# Patient Record
Sex: FEMALE | Race: WHITE | NOT HISPANIC OR LATINO | Employment: UNEMPLOYED | URBAN - METROPOLITAN AREA
[De-identification: names, ages, dates, MRNs, and addresses within clinical notes are randomized per-mention and may not be internally consistent; named-entity substitution may affect disease eponyms.]

---

## 2018-03-08 ENCOUNTER — HISTORICAL (OUTPATIENT)
Dept: ADMINISTRATIVE | Facility: HOSPITAL | Age: 63
End: 2018-03-08

## 2018-03-08 LAB
ALBUMIN SERPL-MCNC: 4.5 GM/DL (ref 3.4–5)
ALP SERPL-CCNC: 58 UNIT/L (ref 38–126)
ALT SERPL-CCNC: 25 UNIT/L (ref 7–52)
AST SERPL-CCNC: 23 UNIT/L (ref 15–37)
BILIRUB SERPL-MCNC: 0.5 MG/DL (ref 0.2–1)
BILIRUBIN DIRECT+TOT PNL SERPL-MCNC: <0.2 MG/DL (ref 0–0.5)
CHOLEST SERPL-MCNC: 190 MG/DL (ref 0–200)
CHOLEST/HDLC SERPL: 2.6 {RATIO}
DEPRECATED CALCIDIOL+CALCIFEROL SERPL-MC: 34 NG/ML (ref 30–80)
HDLC SERPL-MCNC: 73 MG/DL (ref 35–60)
LDLC SERPL CALC-MCNC: 86 MG/DL (ref 0–129)
TRIGL SERPL-MCNC: 121 MG/DL (ref 30–150)
VLDLC SERPL CALC-MCNC: 24.2 MG/DL

## 2018-04-24 ENCOUNTER — HISTORICAL (OUTPATIENT)
Dept: ADMINISTRATIVE | Facility: HOSPITAL | Age: 63
End: 2018-04-24

## 2018-04-24 LAB
ABS NEUT (OLG): 3.4
APPEARANCE, UA: CLEAR
BACTERIA #/AREA URNS AUTO: ABNORMAL /HPF
BILIRUB UR QL STRIP: NEGATIVE MG/DL
COLOR UR: YELLOW
ERYTHROCYTE [DISTWIDTH] IN BLOOD BY AUTOMATED COUNT: 13 % (ref 11.5–17)
GLUCOSE (UA): NEGATIVE MG/DL
HCT VFR BLD AUTO: 43.3 % (ref 37–47)
HGB BLD-MCNC: 14.9 GM/DL (ref 12–16)
HGB UR QL STRIP: NEGATIVE UNIT/L
KETONES UR QL STRIP: NEGATIVE MG/DL
LEUKOCYTE ESTERASE UR QL STRIP: ABNORMAL UNIT/L
LYMPHOCYTES # BLD AUTO: 1.5 X10(3)/MCL (ref 0.6–3.4)
LYMPHOCYTES NFR BLD AUTO: 28.3 % (ref 13–40)
MCH RBC QN AUTO: 31.8 PG (ref 27–31.2)
MCHC RBC AUTO-ENTMCNC: 34 GM/DL (ref 32–36)
MCV RBC AUTO: 92 FL (ref 80–94)
MONOCYTES # BLD AUTO: 0.5 X10(3)/MCL (ref 0–1.8)
MONOCYTES NFR BLD AUTO: 9.4 % (ref 0.1–24)
NEUTROPHILS NFR BLD AUTO: 62.3 % (ref 47–80)
NITRITE UR QL STRIP.AUTO: NEGATIVE
PH UR STRIP: 5.5 [PH]
PLATELET # BLD AUTO: 244 X10(3)/MCL (ref 130–400)
PMV BLD AUTO: 9.2 FL
PROT UR QL STRIP: NEGATIVE MG/DL
RBC # BLD AUTO: 4.69 X10(6)/MCL (ref 4.2–5.4)
RBC #/AREA URNS HPF: ABNORMAL /HPF
SP GR UR STRIP: 1.02
SQUAMOUS EPITHELIAL, UA: ABNORMAL /LPF
T3FREE SERPL-MCNC: 2.22 PG/ML (ref 1.45–3.48)
T4 FREE SERPL-MCNC: 1.23 NG/DL (ref 0.76–1.46)
TSH SERPL-ACNC: 2.55 MIU/ML (ref 0.35–4.94)
UROBILINOGEN UR STRIP-ACNC: 0.2 MG/DL
WBC # SPEC AUTO: 5.4 X10(3)/MCL (ref 4.5–11.5)
WBC #/AREA URNS AUTO: ABNORMAL /[HPF]

## 2018-05-01 ENCOUNTER — HISTORICAL (OUTPATIENT)
Dept: ADMINISTRATIVE | Facility: HOSPITAL | Age: 63
End: 2018-05-01

## 2018-05-01 LAB — H PYLORI AB SER IA-ACNC: NEGATIVE

## 2018-11-01 ENCOUNTER — HISTORICAL (OUTPATIENT)
Dept: ADMINISTRATIVE | Facility: HOSPITAL | Age: 63
End: 2018-11-01

## 2018-11-01 LAB
ALBUMIN SERPL-MCNC: 4.7 GM/DL (ref 3.4–5)
ALBUMIN/GLOB SERPL: 1.88 {RATIO} (ref 1.5–2.5)
ALP SERPL-CCNC: 59 UNIT/L (ref 38–126)
ALT SERPL-CCNC: 28 UNIT/L (ref 7–52)
APPEARANCE, UA: ABNORMAL
AST SERPL-CCNC: 23 UNIT/L (ref 15–37)
BACTERIA #/AREA URNS AUTO: ABNORMAL /HPF
BILIRUB SERPL-MCNC: 0.7 MG/DL (ref 0.2–1)
BILIRUB UR QL STRIP: NEGATIVE MG/DL
BILIRUBIN DIRECT+TOT PNL SERPL-MCNC: 0.2 MG/DL (ref 0–0.5)
BILIRUBIN DIRECT+TOT PNL SERPL-MCNC: 0.5 MG/DL
BUN SERPL-MCNC: 11 MG/DL (ref 7–18)
CALCIUM SERPL-MCNC: 9.4 MG/DL (ref 8.5–10)
CHLORIDE SERPL-SCNC: 105 MMOL/L (ref 98–107)
CO2 SERPL-SCNC: 32 MMOL/L (ref 21–32)
COLOR UR: YELLOW
CREAT SERPL-MCNC: 0.84 MG/DL (ref 0.6–1.3)
DEPRECATED CALCIDIOL+CALCIFEROL SERPL-MC: 37.7 NG/ML (ref 30–80)
GLOBULIN SER-MCNC: 2.5 GM/DL (ref 1.2–3)
GLUCOSE (UA): NEGATIVE MG/DL
GLUCOSE SERPL-MCNC: 92 MG/DL (ref 74–106)
HGB UR QL STRIP: NEGATIVE UNIT/L
KETONES UR QL STRIP: NEGATIVE MG/DL
LEUKOCYTE ESTERASE UR QL STRIP: ABNORMAL UNIT/L
NITRITE UR QL STRIP.AUTO: POSITIVE
PH UR STRIP: 7 [PH]
POTASSIUM SERPL-SCNC: 4.3 MMOL/L (ref 3.5–5.1)
PROT SERPL-MCNC: 7.2 GM/DL (ref 6.4–8.2)
PROT UR QL STRIP: NEGATIVE MG/DL
RBC #/AREA URNS HPF: ABNORMAL /HPF
SODIUM SERPL-SCNC: 142 MMOL/L (ref 136–145)
SP GR UR STRIP: 1.02
SQUAMOUS EPITHELIAL, UA: ABNORMAL /LPF
T3FREE SERPL-MCNC: 2.16 PG/ML (ref 1.45–3.48)
T4 FREE SERPL-MCNC: 1.13 NG/DL (ref 0.76–1.46)
TSH SERPL-ACNC: 2.75 MIU/ML (ref 0.35–4.94)
UROBILINOGEN UR STRIP-ACNC: 0.2 MG/DL
WBC #/AREA URNS AUTO: ABNORMAL /[HPF]

## 2018-12-04 ENCOUNTER — HISTORICAL (OUTPATIENT)
Dept: ADMINISTRATIVE | Facility: HOSPITAL | Age: 63
End: 2018-12-04

## 2018-12-04 LAB
ABS NEUT (OLG): 2.4 X10(3)/MCL (ref 2.1–9.2)
ALBUMIN SERPL-MCNC: 4.8 GM/DL (ref 3.4–5)
ALP SERPL-CCNC: 61 UNIT/L (ref 38–126)
ALT SERPL-CCNC: 29 UNIT/L (ref 7–52)
AST SERPL-CCNC: 24 UNIT/L (ref 15–37)
BILIRUB SERPL-MCNC: 0.5 MG/DL (ref 0.2–1)
BILIRUBIN DIRECT+TOT PNL SERPL-MCNC: 0.1 MG/DL (ref 0–0.5)
BILIRUBIN DIRECT+TOT PNL SERPL-MCNC: 0.4 MG/DL
CHOLEST SERPL-MCNC: 191 MG/DL (ref 0–200)
CHOLEST/HDLC SERPL: 2.7 {RATIO}
DEPRECATED CALCIDIOL+CALCIFEROL SERPL-MC: 38 NG/ML (ref 30–80)
ERYTHROCYTE [DISTWIDTH] IN BLOOD BY AUTOMATED COUNT: 13.4 % (ref 11.5–17)
HCT VFR BLD AUTO: 42.1 % (ref 37–47)
HDLC SERPL-MCNC: 72 MG/DL (ref 35–60)
HGB BLD-MCNC: 13.3 GM/DL (ref 12–16)
LDLC SERPL CALC-MCNC: 81 MG/DL (ref 0–129)
LYMPHOCYTES # BLD AUTO: 1.4 X10(3)/MCL (ref 0.6–3.4)
LYMPHOCYTES NFR BLD AUTO: 32.3 % (ref 13–40)
MCH RBC QN AUTO: 30 PG (ref 27–31.2)
MCHC RBC AUTO-ENTMCNC: 32 GM/DL (ref 32–36)
MCV RBC AUTO: 95 FL (ref 80–94)
MONOCYTES # BLD AUTO: 0.5 X10(3)/MCL (ref 0.1–1.3)
MONOCYTES NFR BLD AUTO: 11 % (ref 0.1–24)
NEUTROPHILS NFR BLD AUTO: 56.7 % (ref 47–80)
PLATELET # BLD AUTO: 218 X10(3)/MCL (ref 130–400)
PMV BLD AUTO: 9.5 FL (ref 9.4–12.4)
PROT SERPL-MCNC: 7.3 GM/DL (ref 6.4–8.2)
RBC # BLD AUTO: 4.43 X10(6)/MCL (ref 4.2–5.4)
TRIGL SERPL-MCNC: 124 MG/DL (ref 30–150)
VLDLC SERPL CALC-MCNC: 24.8 MG/DL
WBC # SPEC AUTO: 4.3 X10(3)/MCL (ref 4.5–11.5)

## 2019-03-12 ENCOUNTER — HISTORICAL (OUTPATIENT)
Dept: ADMINISTRATIVE | Facility: HOSPITAL | Age: 64
End: 2019-03-12

## 2019-09-11 ENCOUNTER — HISTORICAL (OUTPATIENT)
Dept: ADMINISTRATIVE | Facility: HOSPITAL | Age: 64
End: 2019-09-11

## 2019-09-11 LAB
ALBUMIN SERPL-MCNC: 4.6 GM/DL (ref 3.4–5)
ALP SERPL-CCNC: 70 UNIT/L (ref 38–126)
ALT SERPL-CCNC: 74 UNIT/L (ref 7–52)
AST SERPL-CCNC: 50 UNIT/L (ref 15–37)
BILIRUB SERPL-MCNC: 0.6 MG/DL (ref 0.2–1)
BILIRUBIN DIRECT+TOT PNL SERPL-MCNC: 0.1 MG/DL (ref 0–0.5)
BILIRUBIN DIRECT+TOT PNL SERPL-MCNC: 0.5 MG/DL
CHOLEST SERPL-MCNC: 215 MG/DL (ref 0–200)
CHOLEST/HDLC SERPL: 3.4 {RATIO}
HDLC SERPL-MCNC: 63 MG/DL (ref 35–60)
LDLC SERPL CALC-MCNC: 117 MG/DL (ref 0–129)
PROT SERPL-MCNC: 6.6 GM/DL (ref 6.4–8.2)
TRIGL SERPL-MCNC: 120 MG/DL (ref 30–150)
VLDLC SERPL CALC-MCNC: 24 MG/DL

## 2019-10-03 ENCOUNTER — HISTORICAL (OUTPATIENT)
Dept: ADMINISTRATIVE | Facility: HOSPITAL | Age: 64
End: 2019-10-03

## 2019-10-03 LAB
ABS NEUT (OLG): 2.3 X10(3)/MCL (ref 2.1–9.2)
ALBUMIN SERPL-MCNC: 4.7 GM/DL (ref 3.4–5)
ALBUMIN/GLOB SERPL: 1.88 {RATIO} (ref 1.5–2.5)
ALP SERPL-CCNC: 73 UNIT/L (ref 38–126)
ALT SERPL-CCNC: 66 UNIT/L (ref 7–52)
APPEARANCE, UA: CLEAR
AST SERPL-CCNC: 45 UNIT/L (ref 15–37)
BACTERIA #/AREA URNS AUTO: NORMAL /HPF
BILIRUB SERPL-MCNC: 0.6 MG/DL (ref 0.2–1)
BILIRUB UR QL STRIP: NEGATIVE MG/DL
BILIRUBIN DIRECT+TOT PNL SERPL-MCNC: 0.1 MG/DL (ref 0–0.5)
BILIRUBIN DIRECT+TOT PNL SERPL-MCNC: 0.5 MG/DL
BUN SERPL-MCNC: 13 MG/DL (ref 7–18)
CALCIUM SERPL-MCNC: 9.4 MG/DL (ref 8.5–10)
CHLORIDE SERPL-SCNC: 106 MMOL/L (ref 98–107)
CO2 SERPL-SCNC: 27 MMOL/L (ref 21–32)
COLOR UR: YELLOW
CREAT SERPL-MCNC: 0.76 MG/DL (ref 0.6–1.3)
DEPRECATED CALCIDIOL+CALCIFEROL SERPL-MC: 34.5 NG/ML (ref 30–80)
ERYTHROCYTE [DISTWIDTH] IN BLOOD BY AUTOMATED COUNT: 13.3 % (ref 11.5–17)
GLOBULIN SER-MCNC: 2.5 GM/DL (ref 1.2–3)
GLUCOSE (UA): NEGATIVE MG/DL
GLUCOSE SERPL-MCNC: 91 MG/DL (ref 74–106)
HCT VFR BLD AUTO: 40.1 % (ref 37–47)
HGB BLD-MCNC: 13.7 GM/DL (ref 12–16)
HGB UR QL STRIP: NEGATIVE UNIT/L
KETONES UR QL STRIP: NEGATIVE MG/DL
LEUKOCYTE ESTERASE UR QL STRIP: NEGATIVE UNIT/L
LYMPHOCYTES # BLD AUTO: 1.8 X10(3)/MCL (ref 0.6–3.4)
LYMPHOCYTES NFR BLD AUTO: 40 % (ref 13–40)
MCH RBC QN AUTO: 29.3 PG (ref 27–31.2)
MCHC RBC AUTO-ENTMCNC: 34 GM/DL (ref 32–36)
MCV RBC AUTO: 86 FL (ref 80–94)
MONOCYTES # BLD AUTO: 0.4 X10(3)/MCL (ref 0.1–1.3)
MONOCYTES NFR BLD AUTO: 8.5 % (ref 0.1–24)
NEUTROPHILS NFR BLD AUTO: 51.5 % (ref 47–80)
NITRITE UR QL STRIP.AUTO: NEGATIVE
PH UR STRIP: 6 [PH]
PLATELET # BLD AUTO: 238 X10(3)/MCL (ref 130–400)
PMV BLD AUTO: 9.2 FL (ref 9.4–12.4)
POTASSIUM SERPL-SCNC: 4.1 MMOL/L (ref 3.5–5.1)
PROT SERPL-MCNC: 7.2 GM/DL (ref 6.4–8.2)
PROT UR QL STRIP: NEGATIVE MG/DL
RBC # BLD AUTO: 4.67 X10(6)/MCL (ref 4.2–5.4)
RBC #/AREA URNS HPF: NORMAL /HPF
SODIUM SERPL-SCNC: 143 MMOL/L (ref 136–145)
SP GR UR STRIP: 1.02
SQUAMOUS EPITHELIAL, UA: NORMAL /LPF
T3FREE SERPL-MCNC: 2.53 PG/ML (ref 1.45–3.48)
T4 FREE SERPL-MCNC: 0.81 NG/DL (ref 0.76–1.46)
TSH SERPL-ACNC: 1.86 MIU/ML (ref 0.35–4.94)
UROBILINOGEN UR STRIP-ACNC: 0.2 MG/DL
WBC # SPEC AUTO: 4.5 X10(3)/MCL (ref 4.5–11.5)
WBC #/AREA URNS AUTO: NORMAL /[HPF]

## 2020-04-03 ENCOUNTER — HISTORICAL (OUTPATIENT)
Dept: ADMINISTRATIVE | Facility: HOSPITAL | Age: 65
End: 2020-04-03

## 2020-04-03 LAB
ABS NEUT (OLG): 2.2 X10(3)/MCL (ref 2.1–9.2)
ALBUMIN SERPL-MCNC: 4.5 GM/DL (ref 3.4–5)
ALBUMIN/GLOB SERPL: 1.67 {RATIO} (ref 1.5–2.5)
ALP SERPL-CCNC: 68 UNIT/L (ref 38–126)
ALT SERPL-CCNC: 31 UNIT/L (ref 7–52)
AST SERPL-CCNC: 28 UNIT/L (ref 15–37)
BILIRUB SERPL-MCNC: 0.6 MG/DL (ref 0.2–1)
BILIRUBIN DIRECT+TOT PNL SERPL-MCNC: 0.1 MG/DL (ref 0–0.5)
BILIRUBIN DIRECT+TOT PNL SERPL-MCNC: 0.5 MG/DL
BUN SERPL-MCNC: 15 MG/DL (ref 7–18)
CALCIUM SERPL-MCNC: 9.1 MG/DL (ref 8.5–10)
CHLORIDE SERPL-SCNC: 104 MMOL/L (ref 98–107)
CO2 SERPL-SCNC: 30 MMOL/L (ref 21–32)
CREAT SERPL-MCNC: 0.77 MG/DL (ref 0.6–1.3)
DEPRECATED CALCIDIOL+CALCIFEROL SERPL-MC: 41.3 NG/ML (ref 30–80)
ERYTHROCYTE [DISTWIDTH] IN BLOOD BY AUTOMATED COUNT: 13.1 % (ref 11.5–17)
GLOBULIN SER-MCNC: 2.7 GM/DL (ref 1.2–3)
GLUCOSE SERPL-MCNC: 98 MG/DL (ref 74–106)
HCT VFR BLD AUTO: 40.2 % (ref 37–47)
HGB BLD-MCNC: 13.5 GM/DL (ref 12–16)
LYMPHOCYTES # BLD AUTO: 1.6 X10(3)/MCL (ref 0.6–3.4)
LYMPHOCYTES NFR BLD AUTO: 39.2 % (ref 13–40)
MCH RBC QN AUTO: 30.3 PG (ref 27–31.2)
MCHC RBC AUTO-ENTMCNC: 34 GM/DL (ref 32–36)
MCV RBC AUTO: 90 FL (ref 80–94)
MONOCYTES # BLD AUTO: 0.4 X10(3)/MCL (ref 0.1–1.3)
MONOCYTES NFR BLD AUTO: 9.5 % (ref 0.1–24)
NEUTROPHILS NFR BLD AUTO: 51.3 % (ref 47–80)
PLATELET # BLD AUTO: 233 X10(3)/MCL (ref 130–400)
PMV BLD AUTO: 9.7 FL (ref 9.4–12.4)
POTASSIUM SERPL-SCNC: 4.2 MMOL/L (ref 3.5–5.1)
PROT SERPL-MCNC: 7.2 GM/DL (ref 6.4–8.2)
RBC # BLD AUTO: 4.46 X10(6)/MCL (ref 4.2–5.4)
SODIUM SERPL-SCNC: 139 MMOL/L (ref 136–145)
T3FREE SERPL-MCNC: 2.66 PG/ML (ref 1.45–3.48)
T4 FREE SERPL-MCNC: 0.94 NG/DL (ref 0.76–1.46)
TSH SERPL-ACNC: 2.19 MIU/ML (ref 0.35–4.94)
WBC # SPEC AUTO: 4.2 X10(3)/MCL (ref 4.5–11.5)

## 2020-06-22 ENCOUNTER — HISTORICAL (OUTPATIENT)
Dept: ADMINISTRATIVE | Facility: HOSPITAL | Age: 65
End: 2020-06-22

## 2020-07-16 ENCOUNTER — HISTORICAL (OUTPATIENT)
Dept: ADMINISTRATIVE | Facility: HOSPITAL | Age: 65
End: 2020-07-16

## 2020-07-16 LAB
ALBUMIN SERPL-MCNC: 4.4 GM/DL (ref 3.4–5)
ALP SERPL-CCNC: 72 UNIT/L (ref 38–126)
ALT SERPL-CCNC: 37 UNIT/L (ref 7–52)
AST SERPL-CCNC: 35 UNIT/L (ref 15–37)
BILIRUB SERPL-MCNC: 0.7 MG/DL (ref 0.2–1)
BILIRUBIN DIRECT+TOT PNL SERPL-MCNC: 0.2 MG/DL (ref 0–0.5)
BILIRUBIN DIRECT+TOT PNL SERPL-MCNC: 0.5 MG/DL
CHOLEST SERPL-MCNC: 179 MG/DL (ref 0–200)
CHOLEST/HDLC SERPL: 2.9 {RATIO}
HDLC SERPL-MCNC: 62 MG/DL (ref 35–60)
LDLC SERPL CALC-MCNC: 86 MG/DL (ref 0–129)
PROT SERPL-MCNC: 6.5 GM/DL (ref 6.4–8.2)
TRIGL SERPL-MCNC: 122 MG/DL (ref 30–150)
VLDLC SERPL CALC-MCNC: 24.4 MG/DL

## 2020-09-14 ENCOUNTER — HISTORICAL (OUTPATIENT)
Dept: ADMINISTRATIVE | Facility: HOSPITAL | Age: 65
End: 2020-09-14

## 2020-09-14 LAB
ABS NEUT (OLG): 3.1 X10(3)/MCL (ref 2.1–9.2)
ALBUMIN SERPL-MCNC: 4.8 GM/DL (ref 3.4–5)
ALBUMIN/GLOB SERPL: 2.18 {RATIO} (ref 1.5–2.5)
ALP SERPL-CCNC: 78 UNIT/L (ref 38–126)
ALT SERPL-CCNC: 44 UNIT/L (ref 7–52)
AST SERPL-CCNC: 39 UNIT/L (ref 15–37)
BILIRUB SERPL-MCNC: 0.6 MG/DL (ref 0.2–1)
BILIRUBIN DIRECT+TOT PNL SERPL-MCNC: 0.1 MG/DL (ref 0–0.5)
BILIRUBIN DIRECT+TOT PNL SERPL-MCNC: 0.5 MG/DL
BUN SERPL-MCNC: 11 MG/DL (ref 7–18)
CALCIUM SERPL-MCNC: 9.4 MG/DL (ref 8.5–10)
CHLORIDE SERPL-SCNC: 105 MMOL/L (ref 98–107)
CO2 SERPL-SCNC: 25 MMOL/L (ref 21–32)
CREAT SERPL-MCNC: 0.89 MG/DL (ref 0.6–1.3)
ERYTHROCYTE [DISTWIDTH] IN BLOOD BY AUTOMATED COUNT: 13.3 % (ref 11.5–17)
GLOBULIN SER-MCNC: 2.2 GM/DL (ref 1.2–3)
GLUCOSE SERPL-MCNC: 95 MG/DL (ref 74–106)
HCT VFR BLD AUTO: 41.9 % (ref 37–47)
HGB BLD-MCNC: 14.5 GM/DL (ref 12–16)
LYMPHOCYTES # BLD AUTO: 2.5 X10(3)/MCL (ref 0.6–3.4)
LYMPHOCYTES NFR BLD AUTO: 39.7 % (ref 13–40)
MCH RBC QN AUTO: 30.3 PG (ref 27–31.2)
MCHC RBC AUTO-ENTMCNC: 35 GM/DL (ref 32–36)
MCV RBC AUTO: 88 FL (ref 80–94)
MONOCYTES # BLD AUTO: 0.8 X10(3)/MCL (ref 0.1–1.3)
MONOCYTES NFR BLD AUTO: 12.9 % (ref 0.1–24)
NEUTROPHILS NFR BLD AUTO: 47.4 % (ref 47–80)
PLATELET # BLD AUTO: 249 X10(3)/MCL (ref 130–400)
PMV BLD AUTO: 9.9 FL (ref 9.4–12.4)
POTASSIUM SERPL-SCNC: 3.5 MMOL/L (ref 3.5–5.1)
PROT SERPL-MCNC: 7 GM/DL (ref 6.4–8.2)
RBC # BLD AUTO: 4.79 X10(6)/MCL (ref 4.2–5.4)
SODIUM SERPL-SCNC: 141 MMOL/L (ref 136–145)
WBC # SPEC AUTO: 6.4 X10(3)/MCL (ref 4.5–11.5)

## 2020-10-20 ENCOUNTER — HISTORICAL (OUTPATIENT)
Dept: ADMINISTRATIVE | Facility: HOSPITAL | Age: 65
End: 2020-10-20

## 2020-10-20 LAB
ABS NEUT (OLG): 2.4 X10(3)/MCL (ref 2.1–9.2)
ALBUMIN SERPL-MCNC: 4.7 GM/DL (ref 3.4–5)
ALBUMIN/GLOB SERPL: 2.14 {RATIO} (ref 1.5–2.5)
ALP SERPL-CCNC: 83 UNIT/L (ref 38–126)
ALT SERPL-CCNC: 39 UNIT/L (ref 7–52)
APPEARANCE, UA: CLEAR
AST SERPL-CCNC: 35 UNIT/L (ref 15–37)
BACTERIA #/AREA URNS AUTO: ABNORMAL /HPF
BILIRUB SERPL-MCNC: 0.7 MG/DL (ref 0.2–1)
BILIRUB UR QL STRIP: NEGATIVE MG/DL
BILIRUBIN DIRECT+TOT PNL SERPL-MCNC: 0.2 MG/DL (ref 0–0.5)
BILIRUBIN DIRECT+TOT PNL SERPL-MCNC: 0.5 MG/DL
BUN SERPL-MCNC: 12 MG/DL (ref 7–18)
CALCIUM SERPL-MCNC: 9.6 MG/DL (ref 8.5–10)
CHLORIDE SERPL-SCNC: 107 MMOL/L (ref 98–107)
CHOLEST SERPL-MCNC: 184 MG/DL (ref 0–200)
CHOLEST/HDLC SERPL: 3 {RATIO}
CO2 SERPL-SCNC: 29 MMOL/L (ref 21–32)
COLOR UR: ABNORMAL
CREAT SERPL-MCNC: 0.86 MG/DL (ref 0.6–1.3)
DEPRECATED CALCIDIOL+CALCIFEROL SERPL-MC: 58 NG/ML (ref 30–80)
ERYTHROCYTE [DISTWIDTH] IN BLOOD BY AUTOMATED COUNT: 13.2 % (ref 11.5–17)
GLOBULIN SER-MCNC: 2.2 GM/DL (ref 1.2–3)
GLUCOSE (UA): NEGATIVE MG/DL
GLUCOSE SERPL-MCNC: 90 MG/DL (ref 74–106)
H PYLORI AB SER IA-ACNC: NEGATIVE
HCT VFR BLD AUTO: 43.5 % (ref 37–47)
HDLC SERPL-MCNC: 62 MG/DL (ref 35–60)
HGB BLD-MCNC: 14.5 GM/DL (ref 12–16)
HGB UR QL STRIP: NEGATIVE UNIT/L
KETONES UR QL STRIP: ABNORMAL MG/DL
LDLC SERPL CALC-MCNC: 89 MG/DL (ref 0–129)
LEUKOCYTE ESTERASE UR QL STRIP: NEGATIVE UNIT/L
LYMPHOCYTES # BLD AUTO: 1.6 X10(3)/MCL (ref 0.6–3.4)
LYMPHOCYTES NFR BLD AUTO: 36.6 % (ref 13–40)
MCH RBC QN AUTO: 29.7 PG (ref 27–31.2)
MCHC RBC AUTO-ENTMCNC: 33 GM/DL (ref 32–36)
MCV RBC AUTO: 89 FL (ref 80–94)
MONOCYTES # BLD AUTO: 0.4 X10(3)/MCL (ref 0.1–1.3)
MONOCYTES NFR BLD AUTO: 8.7 % (ref 0.1–24)
NEUTROPHILS NFR BLD AUTO: 54.7 % (ref 47–80)
NITRITE UR QL STRIP.AUTO: NEGATIVE
PH UR STRIP: 5 [PH]
PLATELET # BLD AUTO: 236 X10(3)/MCL (ref 130–400)
PMV BLD AUTO: 10.1 FL (ref 9.4–12.4)
POTASSIUM SERPL-SCNC: 4.2 MMOL/L (ref 3.5–5.1)
PROT SERPL-MCNC: 6.9 GM/DL (ref 6.4–8.2)
PROT UR QL STRIP: NEGATIVE MG/DL
RBC # BLD AUTO: 4.89 X10(6)/MCL (ref 4.2–5.4)
RBC #/AREA URNS HPF: ABNORMAL /HPF
SODIUM SERPL-SCNC: 144 MMOL/L (ref 136–145)
SP GR UR STRIP: >1.03
SQUAMOUS EPITHELIAL, UA: ABNORMAL /LPF
T3FREE SERPL-MCNC: 3.09 PG/ML (ref 1.45–3.48)
T4 FREE SERPL-MCNC: 0.88 NG/DL (ref 0.76–1.46)
TRIGL SERPL-MCNC: 195 MG/DL (ref 30–150)
TSH SERPL-ACNC: 1.48 MIU/ML (ref 0.35–4.94)
UROBILINOGEN UR STRIP-ACNC: 0.2 MG/DL
VLDLC SERPL CALC-MCNC: 39 MG/DL
WBC # SPEC AUTO: 4.4 X10(3)/MCL (ref 4.5–11.5)
WBC #/AREA URNS AUTO: ABNORMAL /[HPF]

## 2021-04-13 ENCOUNTER — HISTORICAL (OUTPATIENT)
Dept: ADMINISTRATIVE | Facility: HOSPITAL | Age: 66
End: 2021-04-13

## 2021-04-13 LAB
ALBUMIN SERPL-MCNC: 4.5 GM/DL (ref 3.4–5)
ALP SERPL-CCNC: 71 UNIT/L (ref 38–126)
ALT SERPL-CCNC: 26 UNIT/L (ref 7–52)
AST SERPL-CCNC: 22 UNIT/L (ref 15–37)
BILIRUB SERPL-MCNC: 0.6 MG/DL (ref 0.2–1)
BILIRUBIN DIRECT+TOT PNL SERPL-MCNC: 0.1 MG/DL (ref 0–0.5)
BILIRUBIN DIRECT+TOT PNL SERPL-MCNC: 0.5 MG/DL
CHOLEST SERPL-MCNC: 195 MG/DL (ref 0–200)
CHOLEST/HDLC SERPL: 2.7 {RATIO}
HDLC SERPL-MCNC: 73 MG/DL (ref 35–60)
LDLC SERPL CALC-MCNC: 92 MG/DL (ref 0–129)
PROT SERPL-MCNC: 6.7 GM/DL (ref 6.4–8.2)
TRIGL SERPL-MCNC: 154 MG/DL (ref 30–150)
VLDLC SERPL CALC-MCNC: 30.8 MG/DL

## 2021-04-20 ENCOUNTER — HISTORICAL (OUTPATIENT)
Dept: ADMINISTRATIVE | Facility: HOSPITAL | Age: 66
End: 2021-04-20

## 2021-04-20 LAB
ABS NEUT (OLG): 2.8 X10(3)/MCL (ref 2.1–9.2)
DEPRECATED CALCIDIOL+CALCIFEROL SERPL-MC: 42 NG/ML (ref 30–80)
ERYTHROCYTE [DISTWIDTH] IN BLOOD BY AUTOMATED COUNT: 12.7 % (ref 11.5–17)
HCT VFR BLD AUTO: 39.4 % (ref 37–47)
HGB BLD-MCNC: 13 GM/DL (ref 12–16)
LYMPHOCYTES # BLD AUTO: 2.2 X10(3)/MCL (ref 0.6–3.4)
LYMPHOCYTES NFR BLD AUTO: 40.1 % (ref 13–40)
MCH RBC QN AUTO: 29.2 PG (ref 27–31.2)
MCHC RBC AUTO-ENTMCNC: 33 GM/DL (ref 32–36)
MCV RBC AUTO: 88 FL (ref 80–94)
MONOCYTES # BLD AUTO: 0.4 X10(3)/MCL (ref 0.1–1.3)
MONOCYTES NFR BLD AUTO: 6.8 % (ref 0.1–24)
NEUTROPHILS NFR BLD AUTO: 53.1 % (ref 47–80)
PLATELET # BLD AUTO: 256 X10(3)/MCL (ref 130–400)
PMV BLD AUTO: 9.1 FL (ref 9.4–12.4)
RBC # BLD AUTO: 4.45 X10(6)/MCL (ref 4.2–5.4)
T3FREE SERPL-MCNC: 2.75 PG/ML (ref 1.45–3.48)
T4 FREE SERPL-MCNC: 1.02 NG/DL (ref 0.76–1.46)
TSH SERPL-ACNC: 1.43 MIU/ML (ref 0.35–4.94)
WBC # SPEC AUTO: 5.4 X10(3)/MCL (ref 4.5–11.5)

## 2021-10-21 ENCOUNTER — HISTORICAL (OUTPATIENT)
Dept: ADMINISTRATIVE | Facility: HOSPITAL | Age: 66
End: 2021-10-21

## 2021-10-21 LAB
ABS NEUT (OLG): 2.1 X10(3)/MCL (ref 2.1–9.2)
ALBUMIN SERPL-MCNC: 4.6 GM/DL (ref 3.4–5)
ALBUMIN/GLOB SERPL: 1.77 {RATIO} (ref 1.5–2.5)
ALP SERPL-CCNC: 75 UNIT/L (ref 38–126)
ALT SERPL-CCNC: 45 UNIT/L (ref 7–52)
APPEARANCE, UA: CLEAR
AST SERPL-CCNC: 36 UNIT/L (ref 15–37)
BACTERIA #/AREA URNS AUTO: NORMAL /HPF
BILIRUB SERPL-MCNC: 0.8 MG/DL (ref 0.2–1)
BILIRUB UR QL STRIP: NEGATIVE MG/DL
BILIRUBIN DIRECT+TOT PNL SERPL-MCNC: 0.2 MG/DL (ref 0–0.5)
BILIRUBIN DIRECT+TOT PNL SERPL-MCNC: 0.6 MG/DL
BUN SERPL-MCNC: 13 MG/DL (ref 7–18)
CALCIUM SERPL-MCNC: 9.7 MG/DL (ref 8.5–10)
CHLORIDE SERPL-SCNC: 106 MMOL/L (ref 98–107)
CHOLEST SERPL-MCNC: 199 MG/DL (ref 0–200)
CHOLEST/HDLC SERPL: 3.1 {RATIO}
CO2 SERPL-SCNC: 28 MMOL/L (ref 21–32)
COLOR UR: YELLOW
CREAT SERPL-MCNC: 0.92 MG/DL (ref 0.6–1.3)
DEPRECATED CALCIDIOL+CALCIFEROL SERPL-MC: 38.3 NG/ML (ref 30–80)
ERYTHROCYTE [DISTWIDTH] IN BLOOD BY AUTOMATED COUNT: 12.6 % (ref 11.5–17)
GLOBULIN SER-MCNC: 2.6 GM/DL (ref 1.2–3)
GLUCOSE (UA): NEGATIVE MG/DL
GLUCOSE SERPL-MCNC: 100 MG/DL (ref 74–106)
HCT VFR BLD AUTO: 42.7 % (ref 37–47)
HDLC SERPL-MCNC: 64 MG/DL (ref 35–60)
HGB BLD-MCNC: 14.4 GM/DL (ref 12–16)
HGB UR QL STRIP: NEGATIVE UNIT/L
KETONES UR QL STRIP: NEGATIVE MG/DL
LDLC SERPL CALC-MCNC: 94 MG/DL (ref 0–129)
LEUKOCYTE ESTERASE UR QL STRIP: NEGATIVE UNIT/L
LYMPHOCYTES # BLD AUTO: 1.7 X10(3)/MCL (ref 0.6–3.4)
LYMPHOCYTES NFR BLD AUTO: 41.5 % (ref 13–40)
MCH RBC QN AUTO: 29.8 PG (ref 27–31.2)
MCHC RBC AUTO-ENTMCNC: 34 GM/DL (ref 32–36)
MCV RBC AUTO: 88 FL (ref 80–94)
MONOCYTES # BLD AUTO: 0.4 X10(3)/MCL (ref 0.1–1.3)
MONOCYTES NFR BLD AUTO: 8.5 % (ref 0.1–24)
NEUTROPHILS NFR BLD AUTO: 50 % (ref 47–80)
NITRITE UR QL STRIP.AUTO: NEGATIVE
PH UR STRIP: 5.5 [PH]
PLATELET # BLD AUTO: 236 X10(3)/MCL (ref 130–400)
PMV BLD AUTO: 10.3 FL (ref 9.4–12.4)
POTASSIUM SERPL-SCNC: 4.4 MMOL/L (ref 3.5–5.1)
PROT SERPL-MCNC: 7.2 GM/DL (ref 6.4–8.2)
PROT UR QL STRIP: NEGATIVE MG/DL
RBC # BLD AUTO: 4.84 X10(6)/MCL (ref 4.2–5.4)
RBC #/AREA URNS HPF: NORMAL /HPF
SODIUM SERPL-SCNC: 143 MMOL/L (ref 136–145)
SP GR UR STRIP: 1.02
SQUAMOUS EPITHELIAL, UA: NORMAL /LPF
T3FREE SERPL-MCNC: 3.25 PG/ML (ref 1.45–3.48)
T4 FREE SERPL-MCNC: 0.96 NG/DL (ref 0.76–1.46)
TRIGL SERPL-MCNC: 157 MG/DL (ref 30–150)
TSH SERPL-ACNC: 2.22 MIU/ML (ref 0.35–4.94)
UROBILINOGEN UR STRIP-ACNC: 0.2 MG/DL
VLDLC SERPL CALC-MCNC: 31.4 MG/DL
WBC # SPEC AUTO: 4.2 X10(3)/MCL (ref 4.5–11.5)
WBC #/AREA URNS AUTO: NORMAL /[HPF]

## 2022-04-11 ENCOUNTER — HISTORICAL (OUTPATIENT)
Dept: ADMINISTRATIVE | Facility: HOSPITAL | Age: 67
End: 2022-04-11

## 2022-04-25 VITALS
WEIGHT: 178.81 LBS | SYSTOLIC BLOOD PRESSURE: 139 MMHG | DIASTOLIC BLOOD PRESSURE: 89 MMHG | HEIGHT: 63 IN | BODY MASS INDEX: 31.68 KG/M2

## 2022-10-19 PROBLEM — J18.9 PNEUMONIA: Status: ACTIVE | Noted: 2022-05-05

## 2022-10-19 PROBLEM — I10 PRIMARY HYPERTENSION: Status: ACTIVE | Noted: 2022-09-06

## 2022-10-19 PROBLEM — G62.9 NEUROPATHY: Status: ACTIVE | Noted: 2022-10-19

## 2022-10-19 PROBLEM — E05.90 HYPERTHYROIDISM: Status: ACTIVE | Noted: 2022-10-19

## 2022-10-19 PROBLEM — U07.1 COVID-19: Status: ACTIVE | Noted: 2022-10-19

## 2022-10-26 PROBLEM — F41.9 ANXIETY: Status: ACTIVE | Noted: 2022-10-26

## 2022-10-26 PROBLEM — E55.9 VITAMIN D DEFICIENCY: Status: ACTIVE | Noted: 2022-10-26

## 2022-10-26 PROBLEM — E03.9 HYPOTHYROIDISM: Status: ACTIVE | Noted: 2022-10-26

## 2022-10-26 PROBLEM — G47.33 OBSTRUCTIVE SLEEP APNEA ON CPAP: Status: ACTIVE | Noted: 2022-10-26

## 2022-10-26 PROBLEM — J18.9 PNEUMONIA: Status: RESOLVED | Noted: 2022-05-05 | Resolved: 2022-10-26

## 2022-10-26 PROBLEM — E78.00 HYPERCHOLESTEROLEMIA: Status: ACTIVE | Noted: 2022-10-26

## 2022-10-26 PROBLEM — K21.9 GASTROESOPHAGEAL REFLUX DISEASE WITHOUT ESOPHAGITIS: Status: ACTIVE | Noted: 2022-10-26

## 2023-04-26 PROBLEM — M81.0 AGE-RELATED OSTEOPOROSIS WITHOUT CURRENT PATHOLOGICAL FRACTURE: Status: ACTIVE | Noted: 2023-04-26

## 2023-04-26 PROBLEM — M51.36 DDD (DEGENERATIVE DISC DISEASE), LUMBAR: Status: ACTIVE | Noted: 2023-04-26

## 2023-04-26 PROBLEM — U07.1 COVID-19: Status: RESOLVED | Noted: 2022-10-19 | Resolved: 2023-04-26

## 2023-09-07 ENCOUNTER — PATIENT MESSAGE (OUTPATIENT)
Dept: RESEARCH | Facility: HOSPITAL | Age: 68
End: 2023-09-07

## 2023-09-25 DIAGNOSIS — K44.9 HIATAL HERNIA: Primary | ICD-10-CM

## 2023-09-26 RX ORDER — NITROGLYCERIN 80 MG/1
1 PATCH TRANSDERMAL DAILY
COMMUNITY

## 2023-10-02 ENCOUNTER — OFFICE VISIT (OUTPATIENT)
Dept: SURGERY | Facility: CLINIC | Age: 68
End: 2023-10-02
Payer: COMMERCIAL

## 2023-10-02 VITALS
HEIGHT: 62 IN | DIASTOLIC BLOOD PRESSURE: 90 MMHG | BODY MASS INDEX: 30.18 KG/M2 | WEIGHT: 164 LBS | SYSTOLIC BLOOD PRESSURE: 136 MMHG | HEART RATE: 89 BPM

## 2023-10-02 DIAGNOSIS — K21.9 GASTROESOPHAGEAL REFLUX DISEASE WITHOUT ESOPHAGITIS: ICD-10-CM

## 2023-10-02 DIAGNOSIS — Z98.890 HISTORY OF NISSEN FUNDOPLICATION: ICD-10-CM

## 2023-10-02 DIAGNOSIS — K44.9 HIATAL HERNIA: Primary | ICD-10-CM

## 2023-10-02 PROCEDURE — 3080F DIAST BP >= 90 MM HG: CPT | Mod: CPTII,,, | Performed by: SURGERY

## 2023-10-02 PROCEDURE — 3075F SYST BP GE 130 - 139MM HG: CPT | Mod: CPTII,,, | Performed by: SURGERY

## 2023-10-02 PROCEDURE — 3288F PR FALLS RISK ASSESSMENT DOCUMENTED: ICD-10-PCS | Mod: CPTII,,, | Performed by: SURGERY

## 2023-10-02 PROCEDURE — 3008F BODY MASS INDEX DOCD: CPT | Mod: CPTII,,, | Performed by: SURGERY

## 2023-10-02 PROCEDURE — 1159F MED LIST DOCD IN RCRD: CPT | Mod: CPTII,,, | Performed by: SURGERY

## 2023-10-02 PROCEDURE — 1125F PR PAIN SEVERITY QUANTIFIED, PAIN PRESENT: ICD-10-PCS | Mod: CPTII,,, | Performed by: SURGERY

## 2023-10-02 PROCEDURE — 1159F PR MEDICATION LIST DOCUMENTED IN MEDICAL RECORD: ICD-10-PCS | Mod: CPTII,,, | Performed by: SURGERY

## 2023-10-02 PROCEDURE — 3080F PR MOST RECENT DIASTOLIC BLOOD PRESSURE >= 90 MM HG: ICD-10-PCS | Mod: CPTII,,, | Performed by: SURGERY

## 2023-10-02 PROCEDURE — 1101F PR PT FALLS ASSESS DOC 0-1 FALLS W/OUT INJ PAST YR: ICD-10-PCS | Mod: CPTII,,, | Performed by: SURGERY

## 2023-10-02 PROCEDURE — 99204 OFFICE O/P NEW MOD 45 MIN: CPT | Mod: ,,, | Performed by: SURGERY

## 2023-10-02 PROCEDURE — 3288F FALL RISK ASSESSMENT DOCD: CPT | Mod: CPTII,,, | Performed by: SURGERY

## 2023-10-02 PROCEDURE — 1101F PT FALLS ASSESS-DOCD LE1/YR: CPT | Mod: CPTII,,, | Performed by: SURGERY

## 2023-10-02 PROCEDURE — 3075F PR MOST RECENT SYSTOLIC BLOOD PRESS GE 130-139MM HG: ICD-10-PCS | Mod: CPTII,,, | Performed by: SURGERY

## 2023-10-02 PROCEDURE — 3008F PR BODY MASS INDEX (BMI) DOCUMENTED: ICD-10-PCS | Mod: CPTII,,, | Performed by: SURGERY

## 2023-10-02 PROCEDURE — 99204 PR OFFICE/OUTPT VISIT, NEW, LEVL IV, 45-59 MIN: ICD-10-PCS | Mod: ,,, | Performed by: SURGERY

## 2023-10-02 PROCEDURE — 1125F AMNT PAIN NOTED PAIN PRSNT: CPT | Mod: CPTII,,, | Performed by: SURGERY

## 2023-10-02 RX ORDER — CHOLECALCIFEROL (VITAMIN D3) 25 MCG
1 TABLET ORAL EVERY MORNING
COMMUNITY

## 2023-10-02 RX ORDER — DIAZEPAM 5 MG/1
5 TABLET ORAL EVERY 6 HOURS PRN
COMMUNITY
End: 2023-10-27 | Stop reason: SDDI

## 2023-10-02 NOTE — PROGRESS NOTES
[facility] Surgical - General Surgery Services  [address] [place] [id number]  Phone: [phone]  Fax: [phone]     HISTORY & PHYSICAL  General Surgery  Dr. Guero Mir      Patient Name: Keyana Jameson  YOB: 1955  Author: Federica Garvin, HANNAH     Date: 10/02/2023                   SUBJECTIVE:     Chief Complaint/Reason for Admission:   Chief Complaint   Patient presents with    Consult     Hiatal hernia, food gets stuck after eating, sore for a few days after         History of Present Illness:  Ms. Keyana Jameson is a 68 y.o. female who presents to clinic for surgical evaluation of intractable reflux. Pt's primary c/o is dysphagia and globus sensation. Also having heartburn and regurgitation.  Pt is a poor historian regarding her past surgical hx. Believes she may have had a Nissen a long time ago. No records regarding this.    Pulled records from [facility] from Robotic incisional hernia repair w mesh with Dr. Pérez in 2021. Previous clinical hx from Dr. Pérez listed below:        Past surgical hx:   Lap galdino and Lap nissen Dr. Jimenez  (est. 2005, no records)  Lap LAR 2015 for diverticulitis Dr. Mandel  Robotic incisional hernia repair w mesh Dr. Pérez 2021    Positive symptoms:   Heartburn, Regurgitation, Dysphagia, and Globus    Previous treatments:   PPI    Referring provider: SIGRID El Jr.*   Patient Care Team:  SIGRID El Jr., MD as PCP - General (Family Medicine)  SUNNI Post MD as Consulting Physician (Gastroenterology)  Nargis Linton MD as Consulting Physician (Cardiology)     Pertinent workup:  - Esophagram 9/28/22: Multiple fluoroscopic images were obtained during oral administration of contrast medium.  The swallowing mechanism is not delayed with no evidence of aspiration.  The esophagus is widely patent with no evidence of stenosis, mass lesion, or erosions.  Limited evaluation of the stomach demonstrates a  moderate-sized hiatal hernia.  There was no reflux demonstrated on this exam.  - EGD 5/2023: 5 cm medium HH, esophageal dilation performed, gastritis.  - Esophageal manometry 9/2023: normal     BMI today 30.    Non-smoker.     Review of Systems:  12 point ROS negative except as stated in HPI    PAST HISTORY:     Past Medical History:   Diagnosis Date    Acid reflux     Constipation     Diverticulitis     Hiatal hernia     Hypothyroidism, unspecified     Pneumonia 05/05/2022    Sleep apnea, unspecified      Past Surgical History:   Procedure Laterality Date    CARDIAC CATHETERIZATION  2000    COLON SURGERY  2017    Colon Resection    ESOPHAGOGASTRODUODENOSCOPY  2023    HYSTERECTOMY  1987    INCISIONAL HERNIA REPAIR  03/09/2021    LUMBAR SPINE SURGERY  06/19/2019    SINUS SURGERY       Family History   Problem Relation Age of Onset    Hypertension Mother     Diabetes Sister     Diabetes Brother      Social History     Socioeconomic History    Marital status:     Number of children: 4   Occupational History    Occupation: retired   Tobacco Use    Smoking status: Never    Smokeless tobacco: Never   Substance and Sexual Activity    Alcohol use: Not Currently    Drug use: Never       MEDICATIONS & ALLERGIES:     Current Outpatient Medications on File Prior to Visit   Medication Sig    alendronate (FOSAMAX) 70 MG tablet TAKE 1 TABLET BY MOUTH ONE TIME PER WEEK    diazePAM (VALIUM) 5 MG tablet Take 1 tablet (5 mg total) by mouth 2 (two) times daily as needed for Anxiety.    diazePAM (VALIUM) 5 MG tablet Take 5 mg by mouth every 6 (six) hours as needed.    diclofenac (VOLTAREN) 75 MG EC tablet Take 1 Tab Oral every 12 hours with Food as needed for Joint Pain.    gabapentin (NEURONTIN) 400 MG capsule Take 1 capsule (400 mg total) by mouth 2 (two) times daily.    levothyroxine (SYNTHROID) 50 MCG tablet Take 1 tablet (50 mcg total) by mouth before breakfast.    nitroGLYCERIN 0.4 MG/HR TD PT24 (NITRODUR) 0.4 mg/hr Place 1  "patch onto the skin once daily.    pantoprazole (PROTONIX) 40 MG tablet Take 1 tablet (40 mg total) by mouth once daily.    paroxetine (PAXIL) 10 MG tablet Take 1 tablet (10 mg total) by mouth once daily.    potassium chloride (MICRO-K) 10 MEQ CpSR TAKE 1 CAPSULE BY MOUTH EVERY DAY    traMADoL (ULTRAM) 50 mg tablet Take 1 tablet (50 mg total) by mouth every 6 (six) hours as needed for Pain.    vitamin D (VITAMIN D3) 1000 units Tab Take 1,000 Units by mouth.    vitamin D (VITAMIN D3) 1000 units Tab Take 1 tablet by mouth every morning.    azilsartan med-chlorthalidone (EDARBYCLOR) 40-25 mg Tab Take 1 tablet by mouth once daily.     No current facility-administered medications on file prior to visit.     Review of patient's allergies indicates:  No Known Allergies    OBJECTIVE:     Vitals:    10/02/23 1347   BP: (!) 136/90   Pulse: 89   Weight: 74.4 kg (164 lb)   Height: 5' 2" (1.575 m)     Body mass index is 30 kg/m².    Physical Exam:  General:  Well developed, well nourished, no acute distress  HEENT:  Normocephalic, atraumatic, PERRL, EOMI, clear sclera, neck supple  CVS:  RRR, S1 and S2 normal, no murmurs, rubs, gallops  Resp:  Lungs clear to auscultation, no wheezes, rales, rhonchi, cough  GI:  Abdomen soft, non-tender, non-distended, normoactive bowel sounds, no masses  :   Deferred  MSK:  No muscle atrophy, cyanosis, peripheral edema, full range of motion  Skin:  No rashes, ulcers, erythema  Neuro:  CNII-XII grossly intact  Psych:  Alert and oriented to person, place, and time    Results:  I have independently reviewed all pertinent lab and radiologic studies relevant to general surgery.      VISIT DIAGNOSES:       ICD-10-CM ICD-9-CM   1. Hiatal hernia  K44.9 553.3   2. Gastroesophageal reflux disease without esophagitis  K21.9 530.81       ASSESSMENT/PLAN:     Plan: Laproscopic Nissen Fundoplication and Hiatal Hernia Repair. Recommend takedown of previous Nissen fundoplication, repair of recurrent hiatal " hernia, and re-do Nissen fundoplication, and other indicated procedures.     - Dr. Mir discussed surgical options for anti-reflux procedure. Dr. Mir discussed non-surgical options (continued medical mgmt, dietary/lifestyle changes) vs. surgical intervention (Laparoscopic Nissen fundoplication vs. Toupet fundoplication (if decreased motility)vs. Anne Nissen gastroplasty),.    - Due to previous history of suspected Nissen in 2005, do not recommend attempting LINX.    - Pre op cardiac clearance        - Dr. Mir examined patient, discussed recommendations, obtained informed consent, and answered all questions with patient/family.     - Counseling included differential diagnoses, treatment options, risks and benefits, lifestyle changes, prognosis, and medications.    The patient was interactive, and verbalized understanding.        I, HANNAH Guillermo, am scribing for, and in the presence of, Guero Mir MD, performed the above scribed service and the documentation accurately describes the services I performed. I attest to the accuracy of the note.    HANNAH Dumont

## 2023-10-16 ENCOUNTER — TELEPHONE (OUTPATIENT)
Dept: SURGERY | Facility: CLINIC | Age: 68
End: 2023-10-16
Payer: COMMERCIAL

## 2023-10-16 NOTE — TELEPHONE ENCOUNTER
Called Dr. Linton's office to FU on surgery clearance. SX scheduled 11/6/23. Spoke with  (Flaquita) she will send a message to the nurses to call me back.

## 2023-10-17 NOTE — TELEPHONE ENCOUNTER
Radha with Dr. Mahoney office called back. Pt is needing a CT and echo prior to being cleared. Pt going in 10/19/23 for testing and will have results next week. SX scheduled for 12/11/23.

## 2023-10-30 ENCOUNTER — PATIENT MESSAGE (OUTPATIENT)
Dept: SURGERY | Facility: CLINIC | Age: 68
End: 2023-10-30
Payer: COMMERCIAL

## 2023-12-06 PROBLEM — Z87.19 HISTORY OF DIVERTICULITIS: Status: ACTIVE | Noted: 2023-12-06

## 2023-12-06 PROBLEM — K64.4 RESIDUAL HEMORRHOIDAL SKIN TAGS: Status: ACTIVE | Noted: 2023-10-04

## 2023-12-14 ENCOUNTER — OFFICE VISIT (OUTPATIENT)
Dept: SURGERY | Facility: CLINIC | Age: 68
End: 2023-12-14
Payer: COMMERCIAL

## 2023-12-14 VITALS
SYSTOLIC BLOOD PRESSURE: 144 MMHG | HEIGHT: 62 IN | BODY MASS INDEX: 30.25 KG/M2 | HEART RATE: 80 BPM | DIASTOLIC BLOOD PRESSURE: 95 MMHG | WEIGHT: 164.38 LBS

## 2023-12-14 DIAGNOSIS — K21.9 GASTROESOPHAGEAL REFLUX DISEASE WITHOUT ESOPHAGITIS: Primary | ICD-10-CM

## 2023-12-14 DIAGNOSIS — Z98.890 HISTORY OF NISSEN FUNDOPLICATION: ICD-10-CM

## 2023-12-14 PROCEDURE — 99213 OFFICE O/P EST LOW 20 MIN: CPT | Mod: ,,, | Performed by: SURGERY

## 2023-12-14 PROCEDURE — 3080F DIAST BP >= 90 MM HG: CPT | Mod: CPTII,,, | Performed by: SURGERY

## 2023-12-14 PROCEDURE — 3077F SYST BP >= 140 MM HG: CPT | Mod: CPTII,,, | Performed by: SURGERY

## 2023-12-14 PROCEDURE — 3008F BODY MASS INDEX DOCD: CPT | Mod: CPTII,,, | Performed by: SURGERY

## 2023-12-14 RX ORDER — DICLOFENAC SODIUM 75 MG/1
75 TABLET, DELAYED RELEASE ORAL 2 TIMES DAILY
COMMUNITY

## 2023-12-28 RX ORDER — MULTIVITAMIN
1 TABLET ORAL DAILY
COMMUNITY
End: 2024-01-22 | Stop reason: ALTCHOICE

## 2023-12-28 NOTE — PROGRESS NOTES
Diagnosed with sleep apnea.  Not currently on CPAP.  Needs new MD.  Cardiologist referring for new sleep study.  ML

## 2024-01-05 PROBLEM — K21.9 HIATAL HERNIA WITH GERD: Status: ACTIVE | Noted: 2023-10-02

## 2024-01-05 PROBLEM — Z98.890 HISTORY OF NISSEN FUNDOPLICATION: Status: ACTIVE | Noted: 2024-01-05

## 2024-01-05 RX ORDER — ONDANSETRON 4 MG/1
4 TABLET, FILM COATED ORAL
Status: CANCELLED | OUTPATIENT
Start: 2024-01-05 | End: 2024-01-05

## 2024-01-05 NOTE — DISCHARGE INSTRUCTIONS
Dr. Mir's Laparoscopic Hiatal Hernia Repair, Nissen Fundoplication Discharge Instructions    Refer to this sheet in the next few weeks. These instructions provide you with information about caring for yourself after your procedure. Your health care provider may also give you more specific instructions. Your treatment has been planned according to current medical practices, but problems sometimes occur. Call your health care provider if you have any problems or questions after your procedure.    Hiatal Hernia    A hiatal hernia occurs when part of the stomach slides above the muscle that separates the abdomen from the chest (diaphragm). A person can be born with a hiatal hernia (congenital), or it may develop over time. In almost all cases of hiatal hernia, only the top part of the stomach pushes through the diaphragm.  Many people have a hiatal hernia with no symptoms. The larger the hernia, the more likely it is that you will have symptoms. In some cases, a hiatal hernia allows stomach acid to flow back into the tube that carries food from your mouth to your stomach (esophagus). This may cause heartburn symptoms. Severe heartburn symptoms may mean that you have developed a condition called gastroesophageal reflux disease (GERD).  What are the causes?  This condition is caused by a weakness in the opening (hiatus) where the esophagus passes through the diaphragm to attach to the upper part of the stomach. A person may be born with a weakness in the hiatus, or a weakness can develop over time.  What increases the risk?  This condition is more likely to develop in:   Older people. Age is a major risk factor for a hiatal hernia, especially if you are over the age of 50.   Pregnant women.   People who are overweight.   People who have frequent constipation.  What are the signs or symptoms?  Symptoms of this condition usually develop in the form of GERD symptoms. Symptoms  include:   Heartburn.   Belching.   Indigestion.   Trouble swallowing.   Coughing or wheezing.   Sore throat.   Hoarseness.   Chest pain.   Nausea and vomiting.  How is this diagnosed?  This condition may be diagnosed during testing for GERD. Tests that may be done include:   X-rays of your stomach or chest.   An upper gastrointestinal (GI) series. This is an X-ray exam of your GI tract that is taken after you swallow a chalky liquid that shows up clearly on the X-ray.   Endoscopy. This is a procedure to look into your stomach using a thin, flexible tube that has a tiny camera and light on the end of it.  How is this treated?  This condition may be treated by:   Dietary and lifestyle changes to help reduce GERD symptoms.   Medicines. These may include:  ? Over-the-counter antacids.  ? Medicines that make your stomach empty more quickly.  ? Medicines that block the production of stomach acid (H2 blockers).  ? Stronger medicines to reduce stomach acid (proton pump inhibitors).   Surgery to repair the hernia, if other treatments are not helping.  If you have no symptoms, you may not need treatment.  Follow these instructions at home:  Lifestyle and activity   Do not use any products that contain nicotine or tobacco, such as cigarettes and e-cigarettes. If you need help quitting, ask your health care provider.   Try to achieve and maintain a healthy body weight.   Avoid putting pressure on your abdomen. Anything that puts pressure on your abdomen increases the amount of acid that may be pushed up into your esophagus.  ? Avoid bending over, especially after eating.  ? Raise the head of your bed by putting blocks under the legs. This keeps your head and esophagus higher than your stomach.  ? Do not wear tight clothing around your chest or stomach.  ? Try not to strain when having a bowel movement, when urinating, or when lifting heavy objects.  Eating and drinking   Avoid foods that can worsen GERD symptoms. These may  include:  ? Fatty foods, like fried foods.  ? Citrus fruits, like oranges or lemon.  ? Other foods and drinks that contain acid, like orange juice or tomatoes.  ? Spicy food.  ? Chocolate.   Eat frequent small meals instead of three large meals a day. This helps prevent your stomach from getting too full.  ? Eat slowly.  ? Do not lie down right after eating.  ? Do not eat 1-2 hours before bed.   Do not drink beverages with caffeine. These include cola, coffee, cocoa, and tea.   Do not drink alcohol.  General instructions   Take over-the-counter and prescription medicines only as told by your health care provider.   Keep all follow-up visits as told by your health care provider. This is important.  Contact a health care provider if:   Your symptoms are not controlled with medicines or lifestyle changes.   You are having trouble swallowing.   You have coughing or wheezing that will not go away.  Get help right away if:   Your pain is getting worse.   Your pain spreads to your arms, neck, jaw, teeth, or back.   You have shortness of breath.   You sweat for no reason.   You feel sick to your stomach (nauseous) or you vomit.   You vomit blood.   You have bright red blood in your stools.   You have black, tarry stools.  This information is not intended to replace advice given to you by your health care provider. Make sure you discuss any questions you have with your health care provider.  Document Revised: 11/30/2018 Document Reviewed: 07/23/2018  [employer] Patient Education © 2021 [employer]    Laparoscopic Nissen Fundoplication    Laparoscopic Nissen fundoplication is a surgery to relieve heartburn and other problems caused by fluid from your stomach (gastric fluids) flowing up into your esophagus. The esophagus is the part of the body that moves food from the mouth to the stomach. Normally, the muscle that sits between the stomach and the esophagus (lower esophageal sphincter, LES) keeps stomach fluids in the  stomach.  In some people, the LES does not work properly, and stomach fluids flow up into the esophagus (reflux). This can happen when part of the stomach bulges through the LES (hiatal hernia). The backward flow of stomach fluids can cause a type of severe and long-lasting heartburn that is called gastroesophageal reflux disease (GERD). You may need this surgery if other treatments for GERD have not helped. In this procedure, the upper part of your stomach is wrapped around the lower end of your esophagus and stitched together (sutured). This tightens the connection between your esophagus and stomach to prevent stomach acid reflux.    Summary   Laparoscopic Nissen fundoplication is a surgery to relieve heartburn and other problems caused by gastric fluids flowing up into your esophagus.   You may need this surgery if other treatments for GERD have not helped.   Follow instructions from your health care provider about eating and drinking before the procedure.   Your surgeon will use a thin, lighted tube (laparoscope) that is inserted through a small incision, allowing the surgeon to see into your abdomen.  This information is not intended to replace advice given to you by your health care provider. Make sure you discuss any questions you have with your health care provider.  Document Revised: 07/04/2021 Document Reviewed: 07/04/2021  [employer] Patient Education © 2021 [employer]    WHAT TO EXPECT AFTER THE PROCEDURE  After your procedure, it is common to have:     Difficulty swallowing (dysphagia).    Excess gas (bloating).    HOME CARE INSTRUCTIONS  Medicines    Take medicines only as directed by your health care provider.    Do not drive or operate heavy machinery while taking pain medicine.    Ok to take over the counter Gas-X (Simethicone) as directed for excess gas. Take as directed. This should be taken 30 minutes prior to meals once you start on puree stage of diet. Ok to start immediately after surgery if  you feel bloated.   Ok to take over the counter stool softeners as directed for post op constipation.     IMPORTANT: Continue all pre operative medications you were taking or heartburn/reflux disease. Take as you were taking prior to surgery. Continue all medications for heartburn/reflux for first 30 days post op. Begin weaning (tapering down) the dose of your reflux medication starting at 1 month post op.     Example: Omeprazole 20 mg daily (month 1). Month 2 you would start to take this daily medication every other day: Omeprazole 20 mg every other day for month 2 post op. Month 3 you would continue to wean down/off medication to every third day: Omeprazole 20 mg every 3 days for month 2 post op. Month 4 you can completely discontinue heartburn medications.   If you have any questions on how to taper/wean off these medications, please contact your surgeon's office [phone].     DO NOT SUDDENLY STOP TAKING REFLUX / HEARTBURN MEDICATIONS AFTER SURGERY. THIS CAN LEAD TO REBOUND HEARTBURN SYMPTOMS. THESE MEDICATIONS WILL BE SLOWLY DISCONTINUED OVER 3 MONTHS AFTER SURGERY.    Incision Care    There are many different ways to close and cover an incision, including stitches (sutures), skin glue, and adhesive strips. Follow your health care provider's instructions about:  ?  Incision care.  ?  Bandage (dressing) changes and removal.  ?  Incision closure removal.    Check your incision areas every day for signs of infection. Watch for:  ?  Redness, swelling, or pain.  ?  Fluid, blood, or pus.    Do not take baths, swim, or use a hot tub for 2 weeks post op or until all incisions are closed/healed. Take showers for 2 weeks post op or until all incisions are closed/healed.     Ok to remove surgical dressings and shower 48 hours post op. Wash incisions daily with antibacterial soap and water. Pat dry.   Do not remove steri strips for 5-7 days post op.     Eating and Drinking    Follow your health care provider's  instructions about eating. See specific diet instructions below.     Drink enough fluid to keep your urine clear or pale yellow.    Activities    Return to your normal activities as directed by your health care provider. Ask your health care provider what activities are safe for you.    Avoid strenuous exercise.    Ask your health care provider when you can:  ?  Return to sexual activity.  ?  Go back to work.    No heavy lifting or straining over 10 pounds for 4 weeks post op.  No driving for 3 days or as long as taking post op narcotic pain medications.    SEEK MEDICAL CARE IF:    You have a fever over 101.5 degrees Farenheit.     Your pain gets worse or is not helped by medicine.    You have frequent nausea or vomiting.    You have continued abdominal bloating not relieved by over the counter Gas X (Simethicone).     You have an ongoing (persistent) cough.    You have redness, swelling, or pain in any incision areas.    You have fluid, blood, or pus coming from any incisions.    SEEK IMMEDIATE MEDICAL CARE IF:    You have trouble breathing.    You are unable to swallow.    You have persistent vomiting.    You have blood in your vomit.    You have severe abdominal pain.  This information is not intended to replace advice given to you by your health care provider. Make sure you discuss any questions you have with your health care provider.    Follow instructions provided to you in the office (pink paper) entitled Nissen Aftercare. Contact office with any questions. [phone]    Laparoscopic Surgery    Guero Mir M.D., F.A.C.S.  Tucker Sumner M.D.  [facility] [address]                                                                                         Phone: [phone]  [place]iz  [phone]                                                                                                                                       Fax: [phone]    Post Nissen Fundoplication Diet/ Post  Toupet Fundoplication Diet  *The diet is designed to help control diarrhea, excess gas, and problems with swallowing after surgery    What to avoid:  Absolutely NO STARCHES until surgeon approves.  Examples: Potatoes, Rice, Pasta, Bread, Oats, Corn, Cookies, and Cake  NO carbonated beverages, liquor, or beer.   No dense animal protein.  Examples: Steak, Pork Chop, Chicken Breast    To keep your stomach from stretching:  Eat small, frequent meals to reduce distention (overfull).  Drink fluids between your meals. If you drink too much, it will cause your stomach to stretch.  Limit your fluid intake to ½ cup with meals and one cup with snacks  Small bites and good chewing will aid swallowing and digestion.  Choose foods that are soft and moist, since they will be easier to digest.    To avoid gas:   Okay to take Gas-X (over the counter) as needed for abdominal bloating.  Stay away from drinking through a straw: it will cause you to swallow air.  Don't chew gum or tobacco: it will cause you to swallow air.  Avoid foods that you know already cause gas and distension.   Examples: Beans, peas, cabbage, onions, broccoli, and asparagus.     What will my diet be after surgery?  Your diet will develop gradually based on your progress and food tolerance. Note that each patient advances differently. Your surgeon will advance your diet according to your specific surgery, food tolerances and progress. The following is a listing of how the diet progresses:     Day 1 - 3: Clear Liquid Diet (Day 1 starts the day after surgery)  Day 4 - 6: Full Liquid Diet  (Total of 6 days of only liquids)    Day 7: Soft Diet     Clear Liquid Diet (Days 1 - 3)   A clear liquid is any drink or food that you can see through and is liquid at room temperature. Carbonated drinks are NOT allowed in the first 6-8 weeks and at times never depending on your surgeons' recommendations.  Apple juice  Cranberry juice  Grape juice  Chicken broth  Beef  broth  Flavored gelatin  Sorbet  Decaf tea (hot or cold)    Full Liquid Diet (Days 4 - 6)  A full liquid diet continues with the Clear Liquid Diet, but adds foods that cannot be seen through, like dairy products and cream of wheat. Dairy products like milk, cottage cheese, ice cream, and pudding may cause diarrhea in some patients after surgery. You may need to avoid these products at first, and try them in small amounts as you advance.  All liquids from Clear Liquid Diet  Strained creamed soup (no tomato, broccoli, or potato)  Vanilla and strawberry ice cream (without fruit pieces)  Sherbet  Yogurt (without pieces of fruit)  [place] Instant Breakfast/Ensure/Boost     Soft Diet (Days 7 - until you see your surgeon)  This will be discussed at your first post-operative appointment with your surgeon. Please do not advance your diet with out discussing with your surgeon first.   Tuna salad  Chicken salad  Pudding  Applesauce  Cooked vegetables   Examples: carrots, peas, green beans, and beets            Sample Menu    Breakfast  1 over easy egg or scrambled eggs  ½ cup of applesauce  ½ cup of milk    Mid-Morning Snack  1 Ensure/Boost    Lunch  ½ cup cooked beets  ¾ cup of yogurt with honey  ½ cranberry juice    Mid-Afternoon Snack  1 cup of beef broth  1 stick string cheese (mozzarella)    Dinner  4oz finely grounded chicken with mildly seasoned chicken gravy  ½ cup cooked carrots  ½ cup canned pears  ½ cup decaf iced tea    Evening Snack  ½ cup cottage cheese  ½ cup chopped peaches  1 cup hot decaf tea            Nissen Soft Diet Continued  Food Group Acceptable Foods Foods to Avoid   Beverages  All beverages from Clear Liquid Diet and Full Liquid diet Cocoa or chocolate flavored drinks  Caffeine drinks  Decaf coffee  Carbonated drinks  Alcohol  Citrus juices   Breads (none allowed until approved by surgeon) NONE When allowed to add bread back  into diet, still avoid   Bread products that have nuts, seeds, coconut,  dried or fresh fruit  Highly seasoned breads  Sweet rolls  Coffee cakes  Donuts  Pastries   Cereals NONE Bran or other coarse cereals  Cereals with fresh or dried fruits, coconut, nuts, seeds  Granola    Starches (none allowed until approved by surgeon) NONE When allowed to add starches  back into diet, still avoid:   Fried potatoes  Potato skins   Chips  Brown or wild rice  Popcorn   Protein Sources (can begin seven days after surgery if surgeon approves) Finely grounded lean beef, lamb, veal, poultry, fish  Cottage and ricotta cheese  Mild cheeses: American, Mozzarella, baby Swiss  Plain yogurt Meats, fish, or poultry that are tough/gristly, fried, highly seasoned, smoked, or fatty  Lunch meats  Hotdogs, sausage, tran  Ribs, brisket  Sardines, anchovies, shellfish  Chili  Strong flavored cheese  Crunchy or regular peanut butter  Yogurt with nuts, seeds, coconut  Dried beans, lentils    Eggs Any except those listed in avoid list Fried or highly seasoned eggs  Devilled eggs  Boiled eggs   Food Group Acceptable Foods Foods to Avoid   Vegetables Any cooked vegetables without skin, except those listed in avoid list  Raw vegetables  Any products made with tomatoes   Any vegetables that cause gas, such as: broccoli, brussels sprouts, cauliflower, onions, corn, cucumbers, green peppers, rutabagas, turnips, radishes, and sauerkraut   Fruits  Any canned (except canned pineapple), cooked, or juice not listed in avoid list All fresh and dried fruit  Fruits with seeds or skin  Citrus fruits and juices  Ripe bananas   Soups Mild flavored meat stocks  Cream soups made from allowed foods Highly seasoned soups  Soups made from foods listed in Avoid list   Desserts (can begin seven days after surgery; eat in moderation and with other foods) Pudding  Custard  Ice cream  Gelatin  Popsicles  Fruit ices Any containing chocolate,  coconut, nuts, seeds, fresh or  dried fruit, peppermint,   spearmint   Sweets and Other Foods  Sugar, molasses, syrup, honey  Jams and jellies  Plain and unfilled hard candies  Salt, herbs, spices  Flavor extracts  Mustard  Vinegar Marmalade, preservatives, salsas  Chocolate  Candy with nuts, coconut, seeds, peppermint, spearmint, dried or fresh fruit  Fried or highly seasoned foods   Nuts, coconuts, seeds  Pickles, olives  Chili sauce and powder  Ketchup  Barbeque sauce  Onion and garlic seasonings   Fats Butter, margarine  Mayonnaise  Vegetable oils  Cream sauces, gravies (mildly seasoned)  Dressing made with allowed ingredients  Plain cream cheese Highly seasoned salad dressings, cream sauces, and gravies  [place] fat, ham fat, salt pork   Fried foods   For any questions or concerns, please contact our office at [phone].

## 2024-01-05 NOTE — PROGRESS NOTES
[facility] Surgical - General Surgery Services  [address] [place] [address]  Phone: [phone]  Fax: [phone]     HISTORY & PHYSICAL  General Surgery  Dr. Guero Mir      Patient Name: Keyana Jameson  YOB: 1955  Author: Guero Mir MD     Date: 01/05/2024                   SUBJECTIVE:     Chief Complaint/Reason for Admission:   Chief Complaint   Patient presents with    Pre-op Exam     Lap redo nissen 01/08/2024        History of Present Illness:  Ms. Keyana Jameson is a 68 y.o. female who presents to clinic for surgical evaluation of intractable reflux.   Had Nissen by Dr. Jimenez in 2005.  Work up reveals recurrent HH.     Positive symptoms:   Heartburn    Previous treatments:   PPI    Referring provider: No ref. provider found   Patient Care Team:  SIGRID El Jr., MD as PCP - General (Family Medicine)  SUNNI Post MD as Consulting Physician (Gastroenterology)  Nargis Linton MD as Consulting Physician (Cardiology)  Guero Mir MD as Surgeon (General Surgery)     Pertinent workup:    X-Ray Knee 3 View Left  See  for faxed results.     IMPRESSION: Please see  or link in Chart Review from order   for report      This procedure was auto-finalized by: Virtual Radiologist  X-Ray Hip 2 or 3 views Right (with Pelvis when performed)  See  for faxed results.     IMPRESSION: Please see  or link in Chart Review from order   for report      This procedure was auto-finalized by: Virtual Radiologist      Review of Systems:  12 point ROS negative except as stated in HPI    PAST HISTORY:     Past Medical History:   Diagnosis Date    Acid reflux     Constipation     Diverticulitis     Hiatal hernia     Hypothyroidism, unspecified     Pneumonia 05/05/2022    Sleep apnea, unspecified      Past Surgical History:   Procedure Laterality Date    CARDIAC CATHETERIZATION  2000     CHOLECYSTECTOMY      COLON SURGERY  2017    Colon Resection    ESOPHAGOGASTRODUODENOSCOPY  2023    HYSTERECTOMY  1987    INCISIONAL HERNIA REPAIR  03/09/2021    LUMBAR SPINE SURGERY  06/19/2019    NISSEN FUNDOPLICATION  2005    Dr. Jimenez    SINUS SURGERY       Family History   Problem Relation Age of Onset    Hypertension Mother     Glaucoma Mother     Diabetes Sister     Diabetes Brother      Social History     Socioeconomic History    Marital status:     Number of children: 4   Occupational History    Occupation: retired   Tobacco Use    Smoking status: Never    Smokeless tobacco: Never   Substance and Sexual Activity    Alcohol use: Not Currently    Drug use: Never    Sexual activity: Not Currently       MEDICATIONS & ALLERGIES:     Current Outpatient Medications on File Prior to Visit   Medication Sig    alendronate (FOSAMAX) 70 MG tablet TAKE 1 TABLET BY MOUTH ONE TIME PER WEEK    azilsartan med-chlorthalidone (EDARBYCLOR) 40-25 mg Tab Take 1 tablet by mouth once daily.    butalbital-acetaminophen-caffeine -40 mg (FIORICET, ESGIC) -40 mg per tablet Take 1 tablet by mouth every 4 (four) hours as needed for Pain. Do not take more than 6 tablets daily (Patient not taking: Reported on 1/2/2024)    diazePAM (VALIUM) 5 MG tablet Take 1 tablet (5 mg total) by mouth 2 (two) times daily as needed for Anxiety.    diclofenac (VOLTAREN) 75 MG EC tablet Take 75 mg by mouth 2 (two) times daily.    gabapentin (NEURONTIN) 400 MG capsule Take 1 capsule (400 mg total) by mouth 2 (two) times daily.    levothyroxine (SYNTHROID) 50 MCG tablet Take 1 tablet (50 mcg total) by mouth before breakfast.    linaCLOtide (LINZESS) 72 mcg Cap capsule Take 72 mcg by mouth every other day.    ondansetron (ZOFRAN-ODT) 8 MG TbDL Take 1 tablet (8 mg total) by mouth every 8 (eight) hours as needed (nausea or vomiting).    pantoprazole (PROTONIX) 40 MG tablet Take 1 tablet (40 mg total) by mouth once daily.    potassium chloride  "(MICRO-K) 10 MEQ CpSR TAKE 1 CAPSULE BY MOUTH EVERY DAY    traMADoL (ULTRAM) 50 mg tablet Take 1 tablet (50 mg total) by mouth every 6 (six) hours as needed for Pain.    vitamin D (VITAMIN D3) 1000 units Tab Take 1 tablet by mouth every morning.    multivitamin (ONE DAILY MULTIVITAMIN) per tablet Take 1 tablet by mouth once daily.    nitroGLYCERIN 0.4 MG/HR TD PT24 (NITRODUR) 0.4 mg/hr Place 1 patch onto the skin once daily.    paroxetine (PAXIL) 10 MG tablet Take 1 tablet (10 mg total) by mouth once daily. (Patient not taking: Reported on 1/2/2024)     No current facility-administered medications on file prior to visit.     Review of patient's allergies indicates:  No Known Allergies    OBJECTIVE:     Vitals:    12/14/23 1105   BP: (!) 144/95   BP Location: Left arm   Patient Position: Sitting   Pulse: 80   Weight: 74.6 kg (164 lb 6.4 oz)   Height: 5' 2" (1.575 m)     Body mass index is 30.07 kg/m².    Physical Exam:  General:  Well developed, well nourished, no acute distress  HEENT:  Normocephalic, atraumatic, PERRL, EOMI, clear sclera, neck supple  CVS:  RRR, S1 and S2 normal, no murmurs, rubs, gallops  Resp:  Lungs clear to auscultation, no wheezes, rales, rhonchi, cough  GI:  Abdomen soft, non-tender, non-distended, normoactive bowel sounds, no masses  :   Deferred  MSK:  No muscle atrophy, cyanosis, peripheral edema, full range of motion  Skin:  No rashes, ulcers, erythema  Neuro:  CNII-XII grossly intact  Psych:  Alert and oriented to person, place, and time    Results:  I have independently reviewed all pertinent lab and radiologic studies relevant to general surgery.      VISIT DIAGNOSES:   No diagnosis found.    ASSESSMENT/PLAN:     Plan: Laproscopic Nissen Fundoplication, Toupet Fundoplication, and Gastropexy    - Order esophagram, esophageal manometry/motility study, EGD if not done within 12 months.     - Dr. Mir discussed surgical options for anti-reflux procedure. Complete workup listed above " then plan to have pt return to clinic to discuss surgery more in detail. Dr. Mir discussed non-surgical options (continued medical mgmt, dietary/lifestyle changes) vs. surgical intervention (Laparoscopic Nissen fundoplication vs. Toupet fundoplication (if decreased motility)vs. Anne Nissen gastroplasty), Laparoscopic repair of hiatal hernia, and magnetic sphincter augmentation Linx procedure.         - Dr. Mir examined patient, discussed recommendations, obtained informed consent, and answered all questions with patient/family.     - Counseling included differential diagnoses, treatment options, risks and benefits, lifestyle changes, prognosis, and medications.    The patient was interactive, and verbalized understanding.    Guero Mir MD

## 2024-01-05 NOTE — H&P (VIEW-ONLY)
[facility] Surgical - General Surgery Services  [address] [place] [address]  Phone: [phone]  Fax: [phone]     HISTORY & PHYSICAL  General Surgery  Dr. Guero Mir      Patient Name: Keyana Jameson  YOB: 1955  Author: Guero Mir MD     Date: 01/05/2024                   SUBJECTIVE:     Chief Complaint/Reason for Admission:   Chief Complaint   Patient presents with    Pre-op Exam     Lap redo nissen 01/08/2024        History of Present Illness:  Ms. Keyana Jameson is a 68 y.o. female who presents to clinic for surgical evaluation of intractable reflux.   Had Nissen by Dr. Jimenez in 2005.  Work up reveals recurrent HH.     Positive symptoms:   Heartburn    Previous treatments:   PPI    Referring provider: No ref. provider found   Patient Care Team:  SIGRID El Jr., MD as PCP - General (Family Medicine)  SUNNI Post MD as Consulting Physician (Gastroenterology)  Nargis Linton MD as Consulting Physician (Cardiology)  Guero Mir MD as Surgeon (General Surgery)     Pertinent workup:    X-Ray Knee 3 View Left  See  for faxed results.     IMPRESSION: Please see  or link in Chart Review from order   for report      This procedure was auto-finalized by: Virtual Radiologist  X-Ray Hip 2 or 3 views Right (with Pelvis when performed)  See  for faxed results.     IMPRESSION: Please see  or link in Chart Review from order   for report      This procedure was auto-finalized by: Virtual Radiologist      Review of Systems:  12 point ROS negative except as stated in HPI    PAST HISTORY:     Past Medical History:   Diagnosis Date    Acid reflux     Constipation     Diverticulitis     Hiatal hernia     Hypothyroidism, unspecified     Pneumonia 05/05/2022    Sleep apnea, unspecified      Past Surgical History:   Procedure Laterality Date    CARDIAC CATHETERIZATION  2000     CHOLECYSTECTOMY      COLON SURGERY  2017    Colon Resection    ESOPHAGOGASTRODUODENOSCOPY  2023    HYSTERECTOMY  1987    INCISIONAL HERNIA REPAIR  03/09/2021    LUMBAR SPINE SURGERY  06/19/2019    NISSEN FUNDOPLICATION  2005    Dr. Jimenez    SINUS SURGERY       Family History   Problem Relation Age of Onset    Hypertension Mother     Glaucoma Mother     Diabetes Sister     Diabetes Brother      Social History     Socioeconomic History    Marital status:     Number of children: 4   Occupational History    Occupation: retired   Tobacco Use    Smoking status: Never    Smokeless tobacco: Never   Substance and Sexual Activity    Alcohol use: Not Currently    Drug use: Never    Sexual activity: Not Currently       MEDICATIONS & ALLERGIES:     Current Outpatient Medications on File Prior to Visit   Medication Sig    alendronate (FOSAMAX) 70 MG tablet TAKE 1 TABLET BY MOUTH ONE TIME PER WEEK    azilsartan med-chlorthalidone (EDARBYCLOR) 40-25 mg Tab Take 1 tablet by mouth once daily.    butalbital-acetaminophen-caffeine -40 mg (FIORICET, ESGIC) -40 mg per tablet Take 1 tablet by mouth every 4 (four) hours as needed for Pain. Do not take more than 6 tablets daily (Patient not taking: Reported on 1/2/2024)    diazePAM (VALIUM) 5 MG tablet Take 1 tablet (5 mg total) by mouth 2 (two) times daily as needed for Anxiety.    diclofenac (VOLTAREN) 75 MG EC tablet Take 75 mg by mouth 2 (two) times daily.    gabapentin (NEURONTIN) 400 MG capsule Take 1 capsule (400 mg total) by mouth 2 (two) times daily.    levothyroxine (SYNTHROID) 50 MCG tablet Take 1 tablet (50 mcg total) by mouth before breakfast.    linaCLOtide (LINZESS) 72 mcg Cap capsule Take 72 mcg by mouth every other day.    ondansetron (ZOFRAN-ODT) 8 MG TbDL Take 1 tablet (8 mg total) by mouth every 8 (eight) hours as needed (nausea or vomiting).    pantoprazole (PROTONIX) 40 MG tablet Take 1 tablet (40 mg total) by mouth once daily.    potassium chloride  "(MICRO-K) 10 MEQ CpSR TAKE 1 CAPSULE BY MOUTH EVERY DAY    traMADoL (ULTRAM) 50 mg tablet Take 1 tablet (50 mg total) by mouth every 6 (six) hours as needed for Pain.    vitamin D (VITAMIN D3) 1000 units Tab Take 1 tablet by mouth every morning.    multivitamin (ONE DAILY MULTIVITAMIN) per tablet Take 1 tablet by mouth once daily.    nitroGLYCERIN 0.4 MG/HR TD PT24 (NITRODUR) 0.4 mg/hr Place 1 patch onto the skin once daily.    paroxetine (PAXIL) 10 MG tablet Take 1 tablet (10 mg total) by mouth once daily. (Patient not taking: Reported on 1/2/2024)     No current facility-administered medications on file prior to visit.     Review of patient's allergies indicates:  No Known Allergies    OBJECTIVE:     Vitals:    12/14/23 1105   BP: (!) 144/95   BP Location: Left arm   Patient Position: Sitting   Pulse: 80   Weight: 74.6 kg (164 lb 6.4 oz)   Height: 5' 2" (1.575 m)     Body mass index is 30.07 kg/m².    Physical Exam:  General:  Well developed, well nourished, no acute distress  HEENT:  Normocephalic, atraumatic, PERRL, EOMI, clear sclera, neck supple  CVS:  RRR, S1 and S2 normal, no murmurs, rubs, gallops  Resp:  Lungs clear to auscultation, no wheezes, rales, rhonchi, cough  GI:  Abdomen soft, non-tender, non-distended, normoactive bowel sounds, no masses  :   Deferred  MSK:  No muscle atrophy, cyanosis, peripheral edema, full range of motion  Skin:  No rashes, ulcers, erythema  Neuro:  CNII-XII grossly intact  Psych:  Alert and oriented to person, place, and time    Results:  I have independently reviewed all pertinent lab and radiologic studies relevant to general surgery.      VISIT DIAGNOSES:   No diagnosis found.    ASSESSMENT/PLAN:     Plan: Laproscopic Nissen Fundoplication, Toupet Fundoplication, and Gastropexy    - Order esophagram, esophageal manometry/motility study, EGD if not done within 12 months.     - Dr. Mir discussed surgical options for anti-reflux procedure. Complete workup listed above " then plan to have pt return to clinic to discuss surgery more in detail. Dr. Mir discussed non-surgical options (continued medical mgmt, dietary/lifestyle changes) vs. surgical intervention (Laparoscopic Nissen fundoplication vs. Toupet fundoplication (if decreased motility)vs. Anne Nissen gastroplasty), Laparoscopic repair of hiatal hernia, and magnetic sphincter augmentation Linx procedure.         - Dr. Mir examined patient, discussed recommendations, obtained informed consent, and answered all questions with patient/family.     - Counseling included differential diagnoses, treatment options, risks and benefits, lifestyle changes, prognosis, and medications.    The patient was interactive, and verbalized understanding.    Guero Mir MD

## 2024-01-07 ENCOUNTER — ANESTHESIA EVENT (OUTPATIENT)
Dept: SURGERY | Facility: HOSPITAL | Age: 69
End: 2024-01-07
Payer: COMMERCIAL

## 2024-01-08 ENCOUNTER — ANESTHESIA (OUTPATIENT)
Dept: SURGERY | Facility: HOSPITAL | Age: 69
End: 2024-01-08
Payer: COMMERCIAL

## 2024-01-08 ENCOUNTER — HOSPITAL ENCOUNTER (OUTPATIENT)
Facility: HOSPITAL | Age: 69
Discharge: HOME | End: 2024-01-10
Attending: SURGERY | Admitting: SURGERY
Payer: COMMERCIAL

## 2024-01-08 DIAGNOSIS — K44.9 HIATAL HERNIA WITH GERD: ICD-10-CM

## 2024-01-08 DIAGNOSIS — K21.9 HIATAL HERNIA WITH GERD: ICD-10-CM

## 2024-01-08 PROCEDURE — 43280 LAPAROSCOPY FUNDOPLASTY: CPT | Mod: ,,, | Performed by: SURGERY

## 2024-01-08 PROCEDURE — 71000015 HC POSTOP RECOV 1ST HR: Performed by: SURGERY

## 2024-01-08 PROCEDURE — 71000016 HC POSTOP RECOV ADDL HR: Performed by: SURGERY

## 2024-01-08 PROCEDURE — 71000033 HC RECOVERY, INTIAL HOUR: Performed by: SURGERY

## 2024-01-08 PROCEDURE — 25000003 PHARM REV CODE 250: Performed by: ANESTHESIOLOGY

## 2024-01-08 PROCEDURE — 36000710: Performed by: SURGERY

## 2024-01-08 PROCEDURE — 63600175 PHARM REV CODE 636 W HCPCS: Performed by: NURSE PRACTITIONER

## 2024-01-08 PROCEDURE — 63600175 PHARM REV CODE 636 W HCPCS: Performed by: SURGERY

## 2024-01-08 PROCEDURE — 63600175 PHARM REV CODE 636 W HCPCS: Performed by: NURSE ANESTHETIST, CERTIFIED REGISTERED

## 2024-01-08 PROCEDURE — 43280 LAPAROSCOPY FUNDOPLASTY: CPT | Mod: 80,,, | Performed by: SURGERY

## 2024-01-08 PROCEDURE — 63600175 PHARM REV CODE 636 W HCPCS: Performed by: ANESTHESIOLOGY

## 2024-01-08 PROCEDURE — 25000003 PHARM REV CODE 250: Performed by: NURSE PRACTITIONER

## 2024-01-08 PROCEDURE — 36000711: Performed by: SURGERY

## 2024-01-08 PROCEDURE — C1729 CATH, DRAINAGE: HCPCS | Performed by: SURGERY

## 2024-01-08 PROCEDURE — 25000003 PHARM REV CODE 250: Performed by: NURSE ANESTHETIST, CERTIFIED REGISTERED

## 2024-01-08 PROCEDURE — 37000008 HC ANESTHESIA 1ST 15 MINUTES: Performed by: SURGERY

## 2024-01-08 PROCEDURE — D9220A PRA ANESTHESIA: Mod: CRNA,,, | Performed by: NURSE ANESTHETIST, CERTIFIED REGISTERED

## 2024-01-08 PROCEDURE — D9220A PRA ANESTHESIA: Mod: ANES,,, | Performed by: ANESTHESIOLOGY

## 2024-01-08 PROCEDURE — 37000009 HC ANESTHESIA EA ADD 15 MINS: Performed by: SURGERY

## 2024-01-08 PROCEDURE — 27201423 OPTIME MED/SURG SUP & DEVICES STERILE SUPPLY: Performed by: SURGERY

## 2024-01-08 PROCEDURE — 63600175 PHARM REV CODE 636 W HCPCS: Mod: JZ,JG | Performed by: SURGERY

## 2024-01-08 PROCEDURE — 25000003 PHARM REV CODE 250: Performed by: SURGERY

## 2024-01-08 RX ORDER — HYDROCODONE BITARTRATE AND ACETAMINOPHEN 7.5; 325 MG/1; MG/1
1 TABLET ORAL EVERY 6 HOURS PRN
Status: DISCONTINUED | OUTPATIENT
Start: 2024-01-08 | End: 2024-01-10 | Stop reason: HOSPADM

## 2024-01-08 RX ORDER — MIDAZOLAM HYDROCHLORIDE 1 MG/ML
2 INJECTION INTRAMUSCULAR; INTRAVENOUS
Status: DISCONTINUED | OUTPATIENT
Start: 2024-01-08 | End: 2024-01-08 | Stop reason: HOSPADM

## 2024-01-08 RX ORDER — ONDANSETRON HYDROCHLORIDE 2 MG/ML
INJECTION, SOLUTION INTRAMUSCULAR; INTRAVENOUS
Status: DISCONTINUED | OUTPATIENT
Start: 2024-01-08 | End: 2024-01-08

## 2024-01-08 RX ORDER — ONDANSETRON 2 MG/ML
4 INJECTION INTRAMUSCULAR; INTRAVENOUS ONCE AS NEEDED
Status: DISCONTINUED | OUTPATIENT
Start: 2024-01-08 | End: 2024-01-08 | Stop reason: HOSPADM

## 2024-01-08 RX ORDER — BUTALBITAL, ACETAMINOPHEN AND CAFFEINE 50; 325; 40 MG/1; MG/1; MG/1
1 TABLET ORAL EVERY 4 HOURS PRN
COMMUNITY
End: 2024-01-22 | Stop reason: ALTCHOICE

## 2024-01-08 RX ORDER — HYDROMORPHONE HYDROCHLORIDE 2 MG/ML
0.4 INJECTION, SOLUTION INTRAMUSCULAR; INTRAVENOUS; SUBCUTANEOUS EVERY 5 MIN PRN
Status: DISCONTINUED | OUTPATIENT
Start: 2024-01-08 | End: 2024-01-08 | Stop reason: HOSPADM

## 2024-01-08 RX ORDER — HYDROCODONE BITARTRATE AND ACETAMINOPHEN 7.5; 325 MG/1; MG/1
1 TABLET ORAL EVERY 6 HOURS PRN
Qty: 12 TABLET | Refills: 0 | Status: SHIPPED | OUTPATIENT
Start: 2024-01-08

## 2024-01-08 RX ORDER — ACETAMINOPHEN 10 MG/ML
1000 INJECTION, SOLUTION INTRAVENOUS EVERY 8 HOURS
Status: DISPENSED | OUTPATIENT
Start: 2024-01-08 | End: 2024-01-09

## 2024-01-08 RX ORDER — GABAPENTIN 300 MG/1
300 CAPSULE ORAL
Status: COMPLETED | OUTPATIENT
Start: 2024-01-08 | End: 2024-01-08

## 2024-01-08 RX ORDER — ACETAMINOPHEN 500 MG
1000 TABLET ORAL
Status: COMPLETED | OUTPATIENT
Start: 2024-01-08 | End: 2024-01-08

## 2024-01-08 RX ORDER — OLMESARTAN MEDOXOMIL AND HYDROCHLOROTHIAZIDE 20/12.5 20; 12.5 MG/1; MG/1
1 TABLET ORAL DAILY
Status: DISCONTINUED | OUTPATIENT
Start: 2024-01-09 | End: 2024-01-10 | Stop reason: HOSPADM

## 2024-01-08 RX ORDER — SODIUM CHLORIDE, SODIUM LACTATE, POTASSIUM CHLORIDE, CALCIUM CHLORIDE 600; 310; 30; 20 MG/100ML; MG/100ML; MG/100ML; MG/100ML
INJECTION, SOLUTION INTRAVENOUS CONTINUOUS
Status: DISCONTINUED | OUTPATIENT
Start: 2024-01-08 | End: 2024-01-10 | Stop reason: HOSPADM

## 2024-01-08 RX ORDER — PROCHLORPERAZINE EDISYLATE 5 MG/ML
5 INJECTION INTRAMUSCULAR; INTRAVENOUS EVERY 30 MIN PRN
Status: DISCONTINUED | OUTPATIENT
Start: 2024-01-08 | End: 2024-01-08 | Stop reason: HOSPADM

## 2024-01-08 RX ORDER — ENOXAPARIN SODIUM 100 MG/ML
40 INJECTION SUBCUTANEOUS EVERY 24 HOURS
Status: DISCONTINUED | OUTPATIENT
Start: 2024-01-08 | End: 2024-01-10 | Stop reason: HOSPADM

## 2024-01-08 RX ORDER — LIDOCAINE HYDROCHLORIDE 10 MG/ML
1 INJECTION, SOLUTION EPIDURAL; INFILTRATION; INTRACAUDAL; PERINEURAL ONCE
Status: DISCONTINUED | OUTPATIENT
Start: 2024-01-08 | End: 2024-01-08 | Stop reason: HOSPADM

## 2024-01-08 RX ORDER — DEXAMETHASONE SODIUM PHOSPHATE 4 MG/ML
INJECTION, SOLUTION INTRA-ARTICULAR; INTRALESIONAL; INTRAMUSCULAR; INTRAVENOUS; SOFT TISSUE
Status: DISCONTINUED | OUTPATIENT
Start: 2024-01-08 | End: 2024-01-08

## 2024-01-08 RX ORDER — CELECOXIB 200 MG/1
200 CAPSULE ORAL
Status: COMPLETED | OUTPATIENT
Start: 2024-01-08 | End: 2024-01-08

## 2024-01-08 RX ORDER — NITROGLYCERIN 80 MG/1
1 PATCH TRANSDERMAL
Status: DISCONTINUED | OUTPATIENT
Start: 2024-01-08 | End: 2024-01-08 | Stop reason: HOSPADM

## 2024-01-08 RX ORDER — ROCURONIUM BROMIDE 10 MG/ML
INJECTION, SOLUTION INTRAVENOUS
Status: DISCONTINUED | OUTPATIENT
Start: 2024-01-08 | End: 2024-01-08

## 2024-01-08 RX ORDER — LIDOCAINE HYDROCHLORIDE 10 MG/ML
INJECTION, SOLUTION EPIDURAL; INFILTRATION; INTRACAUDAL; PERINEURAL
Status: DISCONTINUED | OUTPATIENT
Start: 2024-01-08 | End: 2024-01-08

## 2024-01-08 RX ORDER — CEFAZOLIN SODIUM 1 G/3ML
1 INJECTION, POWDER, FOR SOLUTION INTRAMUSCULAR; INTRAVENOUS
Status: DISCONTINUED | OUTPATIENT
Start: 2024-01-08 | End: 2024-01-08 | Stop reason: HOSPADM

## 2024-01-08 RX ORDER — CLONIDINE 0.1 MG/24H
1 PATCH, EXTENDED RELEASE TRANSDERMAL ONCE AS NEEDED
Status: DISCONTINUED | OUTPATIENT
Start: 2024-01-08 | End: 2024-01-10 | Stop reason: HOSPADM

## 2024-01-08 RX ORDER — METHOCARBAMOL 100 MG/ML
1000 INJECTION, SOLUTION INTRAMUSCULAR; INTRAVENOUS ONCE AS NEEDED
Status: COMPLETED | OUTPATIENT
Start: 2024-01-08 | End: 2024-01-08

## 2024-01-08 RX ORDER — PROPOFOL 10 MG/ML
VIAL (ML) INTRAVENOUS
Status: DISCONTINUED | OUTPATIENT
Start: 2024-01-08 | End: 2024-01-08

## 2024-01-08 RX ORDER — PROMETHAZINE HYDROCHLORIDE 25 MG/ML
12.5 INJECTION, SOLUTION INTRAMUSCULAR; INTRAVENOUS EVERY 6 HOURS PRN
Status: DISCONTINUED | OUTPATIENT
Start: 2024-01-08 | End: 2024-01-10 | Stop reason: HOSPADM

## 2024-01-08 RX ORDER — MORPHINE SULFATE 4 MG/ML
2 INJECTION, SOLUTION INTRAMUSCULAR; INTRAVENOUS
Status: ACTIVE | OUTPATIENT
Start: 2024-01-08 | End: 2024-01-09

## 2024-01-08 RX ORDER — ENOXAPARIN SODIUM 100 MG/ML
40 INJECTION SUBCUTANEOUS
Status: DISCONTINUED | OUTPATIENT
Start: 2024-01-08 | End: 2024-01-08 | Stop reason: HOSPADM

## 2024-01-08 RX ORDER — ONDANSETRON 4 MG/1
4 TABLET, ORALLY DISINTEGRATING ORAL
Status: DISCONTINUED | OUTPATIENT
Start: 2024-01-08 | End: 2024-01-08 | Stop reason: HOSPADM

## 2024-01-08 RX ORDER — BUPIVACAINE HYDROCHLORIDE 2.5 MG/ML
INJECTION, SOLUTION EPIDURAL; INFILTRATION; INTRACAUDAL
Status: DISPENSED
Start: 2024-01-08 | End: 2024-01-08

## 2024-01-08 RX ORDER — PANTOPRAZOLE SODIUM 40 MG/1
40 TABLET, DELAYED RELEASE ORAL DAILY
Status: DISCONTINUED | OUTPATIENT
Start: 2024-01-08 | End: 2024-01-10 | Stop reason: HOSPADM

## 2024-01-08 RX ORDER — LABETALOL HYDROCHLORIDE 5 MG/ML
10 INJECTION, SOLUTION INTRAVENOUS
Status: DISCONTINUED | OUTPATIENT
Start: 2024-01-08 | End: 2024-01-10 | Stop reason: HOSPADM

## 2024-01-08 RX ORDER — BUPIVACAINE HYDROCHLORIDE 2.5 MG/ML
INJECTION, SOLUTION EPIDURAL; INFILTRATION; INTRACAUDAL
Status: DISCONTINUED | OUTPATIENT
Start: 2024-01-08 | End: 2024-01-08 | Stop reason: HOSPADM

## 2024-01-08 RX ORDER — FENTANYL CITRATE 50 UG/ML
INJECTION, SOLUTION INTRAMUSCULAR; INTRAVENOUS
Status: DISCONTINUED | OUTPATIENT
Start: 2024-01-08 | End: 2024-01-08

## 2024-01-08 RX ORDER — LEVOTHYROXINE SODIUM 50 UG/1
50 TABLET ORAL
Status: DISCONTINUED | OUTPATIENT
Start: 2024-01-09 | End: 2024-01-10 | Stop reason: HOSPADM

## 2024-01-08 RX ORDER — PROCHLORPERAZINE EDISYLATE 5 MG/ML
5 INJECTION INTRAMUSCULAR; INTRAVENOUS EVERY 4 HOURS PRN
Status: DISCONTINUED | OUTPATIENT
Start: 2024-01-08 | End: 2024-01-10 | Stop reason: HOSPADM

## 2024-01-08 RX ORDER — SUCCINYLCHOLINE CHLORIDE 20 MG/ML
INJECTION INTRAMUSCULAR; INTRAVENOUS
Status: DISCONTINUED | OUTPATIENT
Start: 2024-01-08 | End: 2024-01-08

## 2024-01-08 RX ORDER — NITROGLYCERIN 80 MG/1
1 PATCH TRANSDERMAL DAILY
Status: DISCONTINUED | OUTPATIENT
Start: 2024-01-08 | End: 2024-01-10 | Stop reason: HOSPADM

## 2024-01-08 RX ORDER — DIAZEPAM 5 MG/1
5 TABLET ORAL 2 TIMES DAILY PRN
Status: DISCONTINUED | OUTPATIENT
Start: 2024-01-09 | End: 2024-01-10 | Stop reason: HOSPADM

## 2024-01-08 RX ORDER — ONDANSETRON 2 MG/ML
4 INJECTION INTRAMUSCULAR; INTRAVENOUS EVERY 6 HOURS PRN
Status: DISCONTINUED | OUTPATIENT
Start: 2024-01-08 | End: 2024-01-10 | Stop reason: HOSPADM

## 2024-01-08 RX ORDER — MEPERIDINE HYDROCHLORIDE 25 MG/ML
25 INJECTION INTRAMUSCULAR; INTRAVENOUS; SUBCUTANEOUS EVERY 4 HOURS PRN
Status: DISPENSED | OUTPATIENT
Start: 2024-01-08 | End: 2024-01-09

## 2024-01-08 RX ADMIN — LIDOCAINE HYDROCHLORIDE 50 MG: 10 INJECTION, SOLUTION EPIDURAL; INFILTRATION; INTRACAUDAL; PERINEURAL at 08:01

## 2024-01-08 RX ADMIN — ENOXAPARIN SODIUM 40 MG: 40 INJECTION SUBCUTANEOUS at 07:01

## 2024-01-08 RX ADMIN — ONDANSETRON 4 MG: 4 TABLET, ORALLY DISINTEGRATING ORAL at 07:01

## 2024-01-08 RX ADMIN — MIDAZOLAM HYDROCHLORIDE 2 MG: 1 INJECTION, SOLUTION INTRAMUSCULAR; INTRAVENOUS at 08:01

## 2024-01-08 RX ADMIN — PROPOFOL 140 MG: 10 INJECTION, EMULSION INTRAVENOUS at 08:01

## 2024-01-08 RX ADMIN — GABAPENTIN 300 MG: 300 CAPSULE ORAL at 07:01

## 2024-01-08 RX ADMIN — PROCHLORPERAZINE EDISYLATE 5 MG: 5 INJECTION INTRAMUSCULAR; INTRAVENOUS at 10:01

## 2024-01-08 RX ADMIN — NITROGLYCERIN 1 PATCH: 0.4 PATCH TRANSDERMAL at 07:01

## 2024-01-08 RX ADMIN — FENTANYL CITRATE 50 MCG: 50 INJECTION, SOLUTION INTRAMUSCULAR; INTRAVENOUS at 08:01

## 2024-01-08 RX ADMIN — METHOCARBAMOL 1000 MG: 100 INJECTION INTRAMUSCULAR; INTRAVENOUS at 10:01

## 2024-01-08 RX ADMIN — FENTANYL CITRATE 50 MCG: 50 INJECTION, SOLUTION INTRAMUSCULAR; INTRAVENOUS at 09:01

## 2024-01-08 RX ADMIN — SUGAMMADEX 200 MG: 100 INJECTION, SOLUTION INTRAVENOUS at 09:01

## 2024-01-08 RX ADMIN — HYDROMORPHONE HYDROCHLORIDE 0.4 MG: 2 INJECTION INTRAMUSCULAR; INTRAVENOUS; SUBCUTANEOUS at 10:01

## 2024-01-08 RX ADMIN — MEPERIDINE HYDROCHLORIDE 25 MG: 25 INJECTION INTRAMUSCULAR; INTRAVENOUS; SUBCUTANEOUS at 05:01

## 2024-01-08 RX ADMIN — ONDANSETRON 4 MG: 2 INJECTION INTRAMUSCULAR; INTRAVENOUS at 09:01

## 2024-01-08 RX ADMIN — ROCURONIUM BROMIDE 10 MG: 50 INJECTION INTRAVENOUS at 09:01

## 2024-01-08 RX ADMIN — SUCCINYLCHOLINE CHLORIDE 120 MG: 20 INJECTION, SOLUTION INTRAMUSCULAR; INTRAVENOUS at 08:01

## 2024-01-08 RX ADMIN — ACETAMINOPHEN 1000 MG: 500 TABLET ORAL at 07:01

## 2024-01-08 RX ADMIN — SODIUM CHLORIDE, POTASSIUM CHLORIDE, SODIUM LACTATE AND CALCIUM CHLORIDE: 600; 310; 30; 20 INJECTION, SOLUTION INTRAVENOUS at 09:01

## 2024-01-08 RX ADMIN — ROCURONIUM BROMIDE 35 MG: 50 INJECTION INTRAVENOUS at 08:01

## 2024-01-08 RX ADMIN — ROCURONIUM BROMIDE 5 MG: 50 INJECTION INTRAVENOUS at 08:01

## 2024-01-08 RX ADMIN — DEXAMETHASONE SODIUM PHOSPHATE 8 MG: 4 INJECTION, SOLUTION INTRA-ARTICULAR; INTRALESIONAL; INTRAMUSCULAR; INTRAVENOUS; SOFT TISSUE at 08:01

## 2024-01-08 RX ADMIN — CELECOXIB 200 MG: 200 CAPSULE ORAL at 07:01

## 2024-01-08 RX ADMIN — SODIUM CHLORIDE, POTASSIUM CHLORIDE, SODIUM LACTATE AND CALCIUM CHLORIDE: 600; 310; 30; 20 INJECTION, SOLUTION INTRAVENOUS at 07:01

## 2024-01-08 RX ADMIN — CEFAZOLIN 1 G: 330 INJECTION, POWDER, FOR SOLUTION INTRAMUSCULAR; INTRAVENOUS at 08:01

## 2024-01-08 RX ADMIN — ACETAMINOPHEN 1000 MG: 10 INJECTION, SOLUTION INTRAVENOUS at 09:01

## 2024-01-08 RX ADMIN — ACETAMINOPHEN 1000 MG: 10 INJECTION, SOLUTION INTRAVENOUS at 02:01

## 2024-01-08 NOTE — INTERVAL H&P NOTE
The patient has been examined and the H&P has been reviewed:    I concur with the findings and no changes have occurred since H&P was written.    Procedure risks, benefits and alternative options discussed and understood by patient/family.    Proceed with laparoscopic repair of recurrent HH, red-do fundoplication, and other indicated procedures.    Dr. Mir examined pt, obtained informed consent, and answered all questions.       I, HANNAH Guillermo, am scribing for, and in the presence of, Guero Mir MD, performed the above scribed service and the documentation accurately describes the services I performed. I attest to the accuracy of the note.        Active Hospital Problems    Diagnosis  POA    *Hiatal hernia with GERD [K44.9, K21.9]  Yes    History of Nissen fundoplication [Z98.890]  Not Applicable      Resolved Hospital Problems   No resolved problems to display.

## 2024-01-08 NOTE — OP NOTE
[facility] Surgical - Periop Services  Surgery Department  Operative Note    SUMMARY     Date of Procedure: 1/8/2024     Procedure: Diagnostic Laparoscopy, Re do hiatal hernia repair, Gastropexy    Surgeon(s) and Role:     * Guero Mir MD - Primary     * Tucker Sumner Jr., MD - Assisting        Pre-Operative Diagnosis: Hiatal hernia [K44.9]  Gastroesophageal reflux disease without esophagitis [K21.9]  History of Nissen fundoplication [Z98.890]    Post-Operative Diagnosis: Post-Op Diagnosis Codes:     * Hiatal hernia [K44.9]     * Gastroesophageal reflux disease without esophagitis [K21.9]     * History of Nissen fundoplication [Z98.890]    Anesthesia: General    Operative Findings (including complications, if any): Recurrent Hiatal Hernia     Description of Technical Procedures: After informed consent the patient was taken to the operating room. The patient was placed under general anesthesia successfully. The abdomen was prepped and draped in the usual sterile fashion. An appropriate time out was performed.    An optical-tipped trocar was utilized to access the peritoneal cavity. Pneumoperitoneum was established under direct vision. Additional working ports were placed under direct vision. There was no injury to any viscera upon placement of the trocars.  A self-retaining liver retractor was placed. The abdomen was explored, and no pathology other than the hiatal hernia was identified. There was a moderate to large recurrence noted.  There were several tyknots that were present however they weren't connected to any sutures.  Scare was excised.    Harmonic scalpel was utilized to open the gastrohepatic ligament and identified the right romero of the diaphragm. Further dissection delineated the anterior and posterior decussation near the right romero of the diaphragm. The esophagus with the attached vagus nerve was identified and protected. The left romero of the diaphragm was completely dissected free.  Mediastinal dissection was performed to allow reduction of the esophagus into the abdominal cavity. The greater curvature and fundus of the stomach was completely mobilized with harmonic scalpel. The short gastric vessels were transected with harmonic scalpel. Hemostasis was assured. Left and right vagus nerve were identified and protected.    The hiatus was reapproximated with interrupted 0 Ethibond both posteriorly   A posterior fundus 360° wrap was identied 5 cm posteriorly and 3 cm anteriorly. 2 Ethibond suture was used in to fix the antrum to the parietal peritoneum.  Prior to placement of suture the wrap was checked for excellent geometry.  The wrap appeared to be in excellent position. The wrap was sufficiently loose. There was at least 4 cm of intra-abdominal esophagus. Hemostasis was once again assured. The liver retractor was removed from the peritoneal cavity. Laparotomy sponge and instrument count were correct. TAP block performed. Pneumoperitoneum was released, trocars were removed. Skin incisions were closed with subcuticular 4-0 Vicryl suture.    The patient was allowed to emerge from anesthesia and was brought to the recovery room in stable condition.     Estimated Blood Loss (EBL): * No values recorded between 1/8/2024  8:57 AM and 1/8/2024  9:43 AM *           Implants: * No implants in log *    Specimens:   Specimen (24h ago, onward)      None                    Condition: Good    Disposition: PACU - hemodynamically stable.    Attestation: I was present and scrubbed for the entire procedure.

## 2024-01-08 NOTE — ANESTHESIA PROCEDURE NOTES
Intubation    Date/Time: 1/8/2024 8:33 AM    Performed by: Esther Hair CRNA  Authorized by: Brock Vargas MD    Intubation:     Induction:  Rapid sequence induction    Intubated:  Postinduction    Mask Ventilation:  Not attempted    Attempts:  1    Attempted By:  CRNA    Method of Intubation:  Video laryngoscopy    Blade:  Fulton 3    Laryngeal View Grade: Grade I - full view of cords      Difficult Airway Encountered?: No      Complications:  None    Airway Device:  Oral endotracheal tube    Airway Device Size:  6.5    Style/Cuff Inflation:  Cuffed (inflated to minimal occlusive pressure)    Tube secured:  20    Secured at:  The lips    Placement Verified By:  Capnometry    Complicating Factors:  Overbite and small mouth    Findings Post-Intubation:  BS equal bilateral and atraumatic/condition of teeth unchanged  Notes:      Fulton used due to RSI/history of reflux. Easy with fulton.

## 2024-01-08 NOTE — ANESTHESIA POSTPROCEDURE EVALUATION
Anesthesia Post Evaluation    Patient: Keyana Jameson    Procedure(s) Performed: Procedure(s) (LRB):  FUNDOPLICATION, NISSEN, LAPAROSCOPIC  Lap takedown of previous Nissen Fundoplication and REDO Nissen Fundo (N/A)    Final Anesthesia Type: general      Patient location during evaluation: OPS  Patient participation: Yes- Able to Participate  Level of consciousness: awake and oriented  Post-procedure vital signs: reviewed and stable  Pain management: adequate  Airway patency: patent    PONV status at discharge: No PONV  Anesthetic complications: no      Cardiovascular status: hemodynamically stable  Respiratory status: unassisted and spontaneous ventilation  Hydration status: euvolemic  Follow-up not needed.              Vitals Value Taken Time   BP 92/63 01/08/24 1051   Temp 36.7 °C (98.1 °F) 01/08/24 1050   Pulse 78 01/08/24 1055   Resp 16 01/08/24 1055   SpO2 94 % 01/08/24 1055   Vitals shown include unvalidated device data.      Event Time   Out of Recovery 10:58:00         Pain/Clark Score: Pain Rating Prior to Med Admin: 9 (1/8/2024 10:50 AM)  Pain Rating Post Med Admin: 1 (1/8/2024 10:50 AM)  Clark Score: 8 (1/8/2024 10:50 AM)

## 2024-01-08 NOTE — CARE UPDATE
Received patient from the OR, she is arousing upon pacu arrival,case,rejected oral airway, oriented/reassured her, denied c/o, respirations full-regular-deep-clear,hob up 30 degrees.

## 2024-01-08 NOTE — TRANSFER OF CARE
"Anesthesia Transfer of Care Note    Patient: Keyana Jameson    Procedure(s) Performed: Procedure(s) (LRB):  FUNDOPLICATION, NISSEN, LAPAROSCOPIC  Lap takedown of previous Nissen Fundoplication and REDO Nissen Fundo (N/A)    Patient location: PACU    Anesthesia Type: general    Transport from OR: Transported from OR on room air with adequate spontaneous ventilation    Post pain: adequate analgesia    Post assessment: no apparent anesthetic complications    Post vital signs: stable    Level of consciousness: sedated    Nausea/Vomiting: no nausea/vomiting    Complications: none    Transfer of care protocol was followed      Last vitals: Visit Vitals  /78   Pulse 98   Temp 36.5 °C (97.7 °F)   Resp 20   Ht 5' 3" (1.6 m)   Wt 77.8 kg (171 lb 8.3 oz)   SpO2 97%   Breastfeeding No   BMI 30.38 kg/m²     "

## 2024-01-08 NOTE — ANESTHESIA PREPROCEDURE EVALUATION
01/07/2024  Keyana Jameson is a 68 y.o., female, who presents for the following:    Procedure: FUNDOPLICATION, NISSEN, LAPAROSCOPIC  Lap takedown of previous Nissen Fundoplication and REDO Nissen Fundo (Abdomen) - **Have biological mesh for surgery** Lap takedown of previous Nissen Fundoplication and REDO Nissen Fundo   Anesthesia type: General   Diagnosis:      Hiatal hernia [K44.9]      Gastroesophageal reflux disease without esophagitis [K21.9]      History of Nissen fundoplication [Z98.890]   Pre-op diagnosis:      Hiatal hernia [K44.9]      Gastroesophageal reflux disease without esophagitis [K21.9]      History of Nissen fundoplication [Z98.890]   Location: [facility] OR [address] [place] [address] OR   Surgeons: Guero Mir MD     LAB:        Pre-op Assessment    I have reviewed the Patient Summary Reports.     I have reviewed the Nursing Notes. I have reviewed the NPO Status.   I have reviewed the Medications.     Review of Systems  Anesthesia Hx:  No problems with previous Anesthesia             Denies Family Hx of Anesthesia complications.    Denies Personal Hx of Anesthesia complications.                    Social:  Non-Smoker       EENT/Dental:   H/O Vocal Cord Spasm          Cardiovascular:     Hypertension                                        Pulmonary:  Pneumonia      Sleep Apnea, CPAP                Hepatic/GI:    Hiatal Hernia, GERD   H/O Nissen Fundoplication          Musculoskeletal:  Arthritis               Endocrine:   Hypothyroidism          Psych:   anxiety  H/O panic attacks             Physical Exam  General: Alert and Oriented    Airway:  Mallampati: II   Mouth Opening: Normal  TM Distance: Normal  Tongue: Normal  Neck ROM: Normal ROM    Chest/Lungs:  Normal Respiratory Rate    Heart:  Rate: Normal  Rhythm: Regular Rhythm      Anesthesia Plan  Type of Anesthesia, risks & benefits  discussed:    Anesthesia Type: Gen ETT  Intra-op Monitoring Plan: Standard ASA Monitors  Post Op Pain Control Plan: IV/PO Opioids PRN and multimodal analgesia  Induction:  IV  Airway Plan: Direct, Post-Induction  Informed Consent: Informed consent signed with the Patient and all parties understand the risks and agree with anesthesia plan.  All questions answered. Patient consented to blood products? Yes  ASA Score: 2  Day of Surgery Review of History & Physical: H&P Update referred to the surgeon/provider.  Anesthesia Plan Notes: ERAS    Ready For Surgery From Anesthesia Perspective.     .

## 2024-01-09 LAB
ANION GAP SERPL CALC-SCNC: 8 MEQ/L
BASOPHILS # BLD AUTO: 0.01 X10(3)/MCL
BASOPHILS NFR BLD AUTO: 0.1 %
BUN SERPL-MCNC: 12.7 MG/DL (ref 9.8–20.1)
CALCIUM SERPL-MCNC: 8.4 MG/DL (ref 8.4–10.2)
CHLORIDE SERPL-SCNC: 108 MMOL/L (ref 98–107)
CO2 SERPL-SCNC: 24 MMOL/L (ref 23–31)
CREAT SERPL-MCNC: 0.76 MG/DL (ref 0.55–1.02)
CREAT/UREA NIT SERPL: 17
EOSINOPHIL # BLD AUTO: 0 X10(3)/MCL (ref 0–0.9)
EOSINOPHIL NFR BLD AUTO: 0 %
ERYTHROCYTE [DISTWIDTH] IN BLOOD BY AUTOMATED COUNT: 13.1 % (ref 11.5–17)
GFR SERPLBLD CREATININE-BSD FMLA CKD-EPI: >60 MLS/MIN/1.73/M2
GLUCOSE SERPL-MCNC: 106 MG/DL (ref 82–115)
HCT VFR BLD AUTO: 33.6 % (ref 37–47)
HCT VFR BLD AUTO: 35.9 % (ref 37–47)
HGB BLD-MCNC: 11.3 G/DL (ref 12–16)
HGB BLD-MCNC: 12.2 G/DL (ref 12–16)
IMM GRANULOCYTES # BLD AUTO: 0.06 X10(3)/MCL (ref 0–0.04)
IMM GRANULOCYTES NFR BLD AUTO: 0.8 %
LYMPHOCYTES # BLD AUTO: 1.08 X10(3)/MCL (ref 0.6–4.6)
LYMPHOCYTES NFR BLD AUTO: 14.2 %
MCH RBC QN AUTO: 30.1 PG (ref 27–31)
MCHC RBC AUTO-ENTMCNC: 33.6 G/DL (ref 33–36)
MCV RBC AUTO: 89.6 FL (ref 80–94)
MONOCYTES # BLD AUTO: 0.55 X10(3)/MCL (ref 0.1–1.3)
MONOCYTES NFR BLD AUTO: 7.3 %
NEUTROPHILS # BLD AUTO: 5.88 X10(3)/MCL (ref 2.1–9.2)
NEUTROPHILS NFR BLD AUTO: 77.6 %
NRBC BLD AUTO-RTO: 0 %
PLATELET # BLD AUTO: 215 X10(3)/MCL (ref 130–400)
PMV BLD AUTO: 10.4 FL (ref 7.4–10.4)
POTASSIUM SERPL-SCNC: 4.4 MMOL/L (ref 3.5–5.1)
RBC # BLD AUTO: 3.75 X10(6)/MCL (ref 4.2–5.4)
SODIUM SERPL-SCNC: 140 MMOL/L (ref 136–145)
WBC # SPEC AUTO: 7.58 X10(3)/MCL (ref 4.5–11.5)

## 2024-01-09 PROCEDURE — 85018 HEMOGLOBIN: CPT | Mod: 91 | Performed by: NURSE PRACTITIONER

## 2024-01-09 PROCEDURE — 80048 BASIC METABOLIC PNL TOTAL CA: CPT | Performed by: NURSE PRACTITIONER

## 2024-01-09 PROCEDURE — 63600175 PHARM REV CODE 636 W HCPCS: Performed by: NURSE PRACTITIONER

## 2024-01-09 PROCEDURE — 25000003 PHARM REV CODE 250: Performed by: NURSE PRACTITIONER

## 2024-01-09 PROCEDURE — G0378 HOSPITAL OBSERVATION PER HR: HCPCS

## 2024-01-09 PROCEDURE — 85025 COMPLETE CBC W/AUTO DIFF WBC: CPT | Performed by: NURSE PRACTITIONER

## 2024-01-09 RX ADMIN — HYDROCODONE BITARTRATE AND ACETAMINOPHEN 1 TABLET: 7.5; 325 TABLET ORAL at 11:01

## 2024-01-09 RX ADMIN — HYDROCODONE BITARTRATE AND ACETAMINOPHEN 1 TABLET: 7.5; 325 TABLET ORAL at 09:01

## 2024-01-09 RX ADMIN — GABAPENTIN 400 MG: 300 CAPSULE ORAL at 08:01

## 2024-01-09 RX ADMIN — ENOXAPARIN SODIUM 40 MG: 40 INJECTION SUBCUTANEOUS at 08:01

## 2024-01-09 RX ADMIN — SODIUM CHLORIDE, POTASSIUM CHLORIDE, SODIUM LACTATE AND CALCIUM CHLORIDE: 600; 310; 30; 20 INJECTION, SOLUTION INTRAVENOUS at 01:01

## 2024-01-09 RX ADMIN — HYDROCODONE BITARTRATE AND ACETAMINOPHEN 1 TABLET: 7.5; 325 TABLET ORAL at 01:01

## 2024-01-09 RX ADMIN — HYDROCODONE BITARTRATE AND ACETAMINOPHEN 1 TABLET: 7.5; 325 TABLET ORAL at 05:01

## 2024-01-09 NOTE — PROGRESS NOTES
[facility] - Med Surg  General Surgery  Progress Note    Patient Name: Keyana Jameson  YOB: 1955  Author: HANNAH Dumont     Date: 01/09/2024      Admit Diagnosis:   Recurrent Hiatal Hernia   GERD  SOB  Remote history of Lap Nissen Fundoplication 2005    POD# 1 S/P Laparoscopic re-do Nissen fundoplication, Laparoscopic repair of recurrent Hiatal Hernia, Laparoscopic gastropexy per Dr. [facility] Course:  Ms. Keyana Jameson is a 68 y.o. female who was admitted for an elective procedure. Procedure performed as stated above. Upon completion of procedure, pt was transferred from the recovery room to the post surgical floor for further care and treatment. POD#1, afebrile, vital signs stable. The pt's diet was advanced to clear liquids.     Review of Systems: Mild incisional pain reported but tolerable. Some gas pain in chest and in between shoulder blades. No nausea, vomiting, dysphagia, GERD. Patient ambulating in the room/hallway and voiding without difficulty. Pt denies any dizziness, palpitations, SOB, or CP. All other review of systems are negative.     Medications:  Continuous Infusions:   lactated ringers 125 mL/hr at 01/08/24 0720    lactated ringers 100 mL/hr at 01/09/24 0124     Scheduled Meds:   acetaminophen  1,000 mg Intravenous Q8H    enoxaparin  40 mg Subcutaneous Daily    gabapentin  400 mg Oral BID    levothyroxine  50 mcg Oral Before breakfast    nitroGLYCERIN 0.4 MG/HR TD PT24  1 patch Transdermal Daily    olmesartan-hydrochlorothiazide  1 tablet Oral Daily    pantoprazole  40 mg Oral Daily     PRN Meds:cloNIDine 0.1 mg/24 hr td ptwk, diazePAM, HYDROcodone-acetaminophen, labetalol, ondansetron, prochlorperazine, promethazine     Objective:     Vital Signs (Most Recent):  Temp: 97.9 °F (36.6 °C) (01/09/24 0701)  Pulse: 62 (01/09/24 0702)  Resp: 18 (01/09/24 0701)  BP: (!) 159/79 (patient took BP med) (01/09/24 0702)  SpO2: 96 % (01/09/24 0702)  Vital Signs (24h Range):  Temp:  [97.7 °F (36.5 °C)-98.1 °F (36.7 °C)] 97.9 °F (36.6 °C)  Pulse:  [59-99] 62  Resp:  [16-20] 18  SpO2:  [82 %-100 %] 96 %  BP: (106-172)/(60-92) 159/79       Intake/Output Summary (Last 24 hours) at 1/9/2024 0910  Last data filed at 1/9/2024 0716  Gross per 24 hour   Intake 1450 ml   Output 1075 ml   Net 375 ml       Physical Examination:   Vital signs: stable, noted in chart  General: Awake and alert, able to answer all questions. Resting in bed with family member at bedside  Respiratory:  Clear to auscultation bilaterally  Cardio: Regular rate and rhythm.  Abdomen: Soft, non distended. Bowel sounds present to all 4 quadrants. Lap sites clean, dry, and intact. Appropriate point tenderness to lap sites. Abdomen benign.   Neuro: Alert and oriented x's 4.    Significant Labs:  BMP:   Recent Labs   Lab 01/09/24  0420      K 4.4   CO2 24   BUN 12.7   CREATININE 0.76   CALCIUM 8.4     CBC:   Recent Labs   Lab 01/09/24  0420   WBC 7.58   RBC 3.75*   HGB 11.3*   HCT 33.6*      MCV 89.6   MCH 30.1   MCHC 33.6       Drop in H/H from last noted lab results (Hct 43) in October 2023. No pre op labs drawn within 30 days of this surgery noted on chart. Otherwise unremarkable.    Significant Diagnostics:  None    Assessment/Plan:     Active Diagnoses:    Diagnosis Date Noted POA    PRINCIPAL PROBLEM:  Hiatal hernia with GERD [K44.9, K21.9] 10/02/2023 Yes    History of Nissen fundoplication [Z98.890] 01/05/2024 Not Applicable      Problems Resolved During this Admission:       Impression and Plan:     POD#1 status post Lap re-do Nissen fundoplication, Lap HH repair, lap gastropexy    - Due to the extensive nature of adhesiolysis and complexity of revisional foregut surgery, recommend additional night of observation and close clinical monitoring at [facility].  - Advance diet today to clear liquids as tolerated.  - Recheck H/H at 1100.   - Once charbel PO, ok to wean down IVF from 100 cc/hr to 50  cc/hr.   - Continue ambulation in holguin  - Continue IS  - Resume home meds as appropriate  - Discharge instructions reviewed with patient and family. Discussed post op clinical course and recommended plan of care. Both in agreement. All questions answered.   - Consider DC in AM       HANNAH Dumont  General Surgery  [facility] Med [facility]

## 2024-01-09 NOTE — PROGRESS NOTES
POD# 1 Sp Lap re-do Nissen fundoplication, Lap repair of recurrent HH, lap gastropexy    Surgery interim note    Short term recheck H/H stable from this AM 12.2/35.9 from 11.3/33.6 this AM @ 0420    No additional recommendations at this time    Continue CPM    Plan DC in AM if able to charbel PO today    Juanis LOPEZ/Dr. OMA Mir

## 2024-01-10 PROCEDURE — G0378 HOSPITAL OBSERVATION PER HR: HCPCS

## 2024-01-10 PROCEDURE — 25000003 PHARM REV CODE 250: Performed by: NURSE PRACTITIONER

## 2024-01-10 RX ADMIN — HYDROCODONE BITARTRATE AND ACETAMINOPHEN 1 TABLET: 7.5; 325 TABLET ORAL at 09:01

## 2024-01-11 VITALS
HEART RATE: 74 BPM | DIASTOLIC BLOOD PRESSURE: 91 MMHG | OXYGEN SATURATION: 96 % | WEIGHT: 171.5 LBS | HEIGHT: 63 IN | RESPIRATION RATE: 18 BRPM | SYSTOLIC BLOOD PRESSURE: 155 MMHG | BODY MASS INDEX: 30.39 KG/M2 | TEMPERATURE: 98 F

## 2024-01-16 NOTE — DISCHARGE SUMMARY
DISCHARGE NOTE    DISCHARGE DATE:   1/9/24    PRINCIPAL DIAGNOSIS:  Hiatal hernia with GERD    OUTCOME:  Satisfactory    DISPOSITION:  Home    FOLLOWUP:  In general surgery clinic

## 2024-01-22 ENCOUNTER — OFFICE VISIT (OUTPATIENT)
Dept: SURGERY | Facility: CLINIC | Age: 69
End: 2024-01-22
Payer: COMMERCIAL

## 2024-01-22 VITALS
DIASTOLIC BLOOD PRESSURE: 86 MMHG | HEART RATE: 84 BPM | BODY MASS INDEX: 29.02 KG/M2 | SYSTOLIC BLOOD PRESSURE: 142 MMHG | HEIGHT: 63 IN | WEIGHT: 163.81 LBS

## 2024-01-22 DIAGNOSIS — E66.3 OVER WEIGHT: ICD-10-CM

## 2024-01-22 DIAGNOSIS — Z98.890 S/P NISSEN FUNDOPLICATION (WITHOUT GASTROSTOMY TUBE) PROCEDURE: ICD-10-CM

## 2024-01-22 DIAGNOSIS — K21.9 GASTROESOPHAGEAL REFLUX DISEASE, UNSPECIFIED WHETHER ESOPHAGITIS PRESENT: Primary | ICD-10-CM

## 2024-01-22 PROCEDURE — 3077F SYST BP >= 140 MM HG: CPT | Mod: CPTII,,, | Performed by: NURSE PRACTITIONER

## 2024-01-22 PROCEDURE — 1159F MED LIST DOCD IN RCRD: CPT | Mod: CPTII,,, | Performed by: NURSE PRACTITIONER

## 2024-01-22 PROCEDURE — 3079F DIAST BP 80-89 MM HG: CPT | Mod: CPTII,,, | Performed by: NURSE PRACTITIONER

## 2024-01-22 PROCEDURE — 99024 POSTOP FOLLOW-UP VISIT: CPT | Mod: ,,, | Performed by: NURSE PRACTITIONER

## 2024-01-22 RX ORDER — SIMETHICONE 125 MG
125 TABLET,CHEWABLE ORAL EVERY 6 HOURS PRN
COMMUNITY

## 2024-01-22 RX ORDER — AZELASTINE HYDROCHLORIDE 0.5 MG/ML
1 SOLUTION/ DROPS OPHTHALMIC 2 TIMES DAILY
COMMUNITY

## 2024-01-22 NOTE — PROGRESS NOTES
"Initial Consult  Keyana Jameson  Chief Complaint   Patient presents with    Post-op Evaluation     Lap redo nissen fundo 01/08/2024     Expand All Collapse All  Patient Name: Keyana Jameson  YOB: 1955     Date: 01/05/2024                    SUBJECTIVE:      Chief Complaint/Reason for Admission:        Chief Complaint   Patient presents with    Pre-op Exam       Lap redo nissen 01/08/2024         History of Present Illness:  Ms. Keyana Jameson is a 68 y.o. female who presents to clinic for 2 week PO kenna for redo nissen and hiatal hernia repair with gastropexy. Reports that she has had GERD once after eating baked fishes and some "trapped gas at times" but she walks around, belches, and it gets better. Still taking PPI. Denies any nausea, vomiting, diarrhea, some mild constipation but taking Miralax. Currently on soft diet.     Had Nissen by Dr. Jimenez in 2005.  =     Previous treatments:   PPI     Referring provider: No ref. provider found   Patient Care Team:  SIGRID El Jr., MD as PCP - General (Family Medicine)  SUNNI Post MD as Consulting Physician (Gastroenterology)  Nargis Linton MD as Consulting Physician (Cardiology)  Guero Mir MD as Surgeon (General Surgery)           Patient Care Team:  SIGRID El Jr., MD as PCP - General (Family Medicine)  SUNNI Post MD as Consulting Physician (Gastroenterology)  Nargis Linton MD as Consulting Physician (Cardiology)  Guero Mir MD as Surgeon (General Surgery)     Subjective:     12 point review of systems conducted, negative except as stated in the history of present illness. See HPI for details.    Review of patient's allergies indicates:  No Known Allergies  Past Medical History:   Diagnosis Date    Acid reflux     Bursitis     right hip    Chest pressure     wears nitro patch prn; last used patch 1/2/24    Constipation     Coronary artery spasm     Diverticulitis     Hiatal hernia     " Hypertension     Hypothyroidism, unspecified     Laryngitis     one week ago    Pneumonia 05/05/2022    Sleep apnea, unspecified     states she does not use CPAP    Spasm of vocal cords      Past Surgical History:   Procedure Laterality Date    CARDIAC CATHETERIZATION  2000    CHOLECYSTECTOMY      COLON SURGERY  2017    Colon Resection    ESOPHAGOGASTRODUODENOSCOPY  2023    HYSTERECTOMY  1987    INCISIONAL HERNIA REPAIR  03/09/2021    LAPAROSCOPIC NISSEN FUNDOPLICATION N/A 1/8/2024    Procedure: FUNDOPLICATION, NISSEN, LAPAROSCOPIC  Lap takedown of previous Nissen Fundoplication and REDO Nissen Fundo;  Surgeon: Guero Mir MD;  Location: [facility];  Service: General;  Laterality: N/A;  **Have biological mesh for surgery** Lap takedown of previous Nissen Fundoplication and REDO Nissen Fundo    LUMBAR SPINE SURGERY  06/19/2019    NISSEN FUNDOPLICATION  2005    Dr. Jimenez    SINUS SURGERY       Family History   Problem Relation Age of Onset    Hypertension Mother     Glaucoma Mother     Diabetes Sister     Diabetes Brother      Social History     Tobacco Use    Smoking status: Never    Smokeless tobacco: Never   Substance Use Topics    Alcohol use: Not Currently    Drug use: Never      Current Outpatient Medications   Medication Instructions    alendronate (FOSAMAX) 70 mg, Oral, Weekly    azelastine (OPTIVAR) 0.05 % ophthalmic solution 1 drop, 2 times daily    diazePAM (VALIUM) 5 mg, Oral, 2 times daily PRN    diclofenac (VOLTAREN) 75 mg, Oral, 2 times daily    gabapentin (NEURONTIN) 400 mg, Oral, 2 times daily    HYDROcodone-acetaminophen (NORCO) 7.5-325 mg per tablet 1 tablet, Oral, Every 6 hours PRN    levothyroxine (SYNTHROID) 50 mcg, Oral, Before breakfast    nitroGLYCERIN 0.4 MG/HR TD PT24 (NITRODUR) 0.4 mg/hr 1 patch, Transdermal, Daily, Pt states wears patch for twelve hours , then removes patch. Last used 1/2/24    olmesartan-hydrochlorothiazide (BENICAR HCT) 20-12.5 mg per tablet 0.5 tablets, Oral, Daily     "ondansetron (ZOFRAN-ODT) 8 mg, Oral, Every 8 hours PRN    pantoprazole (PROTONIX) 40 mg, Oral, Daily    simethicone (MYLICON) 125 mg, Oral, Every 6 hours PRN    vitamin D (VITAMIN D3) 1000 units Tab 1 tablet, Oral, Every morning       Objective:     Vital Signs (Most Recent):  Visit Vitals  BP (!) 142/86 (BP Location: Left arm, Patient Position: Sitting)   Pulse 84   Ht 5' 3" (1.6 m)   Wt 74.3 kg (163 lb 12.8 oz)   BMI 29.02 kg/m²     Physical Exam:  General:  Alert and oriented.    Respiratory:  Lungs are clear to auscultation, Respirations are non-labored, Breath sounds are equal.    Cardiovascular:  Normal rate, Regular rhythm, No murmur.    Gastrointestinal:  Soft, Non-tender, Non-distended, Normal bowel sounds        Musculoskeletal:  Normal range of motion, Normal strength.    Integumentary:  Warm, Dry, Pink.    Neurologic:  Alert, Oriented.    Psychiatric:  Cooperative.      Assessment:       ICD-10-CM ICD-9-CM   1. Gastroesophageal reflux disease, unspecified whether esophagitis present  K21.9 530.81   2. S/P Nissen fundoplication (without gastrostomy tube) procedure  Z98.890 V67.00   3. Over weight  E66.3 278.02       Plan:     1. Gastroesophageal reflux disease, unspecified whether esophagitis present        2. S/P Nissen fundoplication (without gastrostomy tube) procedure        3. Over weight          - no lifting greater than 15lbs for 2 weeks  - ok to get in tub and soak, ok to use any bath/body wash/soap  - continue soft diet and ok to try more of a variety of fruits and veggies.   - continue to stay off of fibrous meats and breads, rice, pasta.     F/u in 6 weeks with Dr. Mir for 2mth PO .   "

## 2024-03-05 ENCOUNTER — OFFICE VISIT (OUTPATIENT)
Dept: SURGERY | Facility: CLINIC | Age: 69
End: 2024-03-05
Payer: COMMERCIAL

## 2024-03-05 VITALS
HEART RATE: 71 BPM | HEIGHT: 63 IN | SYSTOLIC BLOOD PRESSURE: 142 MMHG | WEIGHT: 155.38 LBS | BODY MASS INDEX: 27.53 KG/M2 | DIASTOLIC BLOOD PRESSURE: 85 MMHG

## 2024-03-05 DIAGNOSIS — Z98.890 HISTORY OF NISSEN FUNDOPLICATION: ICD-10-CM

## 2024-03-05 DIAGNOSIS — Z98.890 S/P NISSEN FUNDOPLICATION (WITHOUT GASTROSTOMY TUBE) PROCEDURE: Primary | ICD-10-CM

## 2024-03-05 DIAGNOSIS — Z48.89 ENCOUNTER FOR POSTOPERATIVE CARE: ICD-10-CM

## 2024-03-05 PROCEDURE — 3079F DIAST BP 80-89 MM HG: CPT | Mod: CPTII,,, | Performed by: SURGERY

## 2024-03-05 PROCEDURE — 99024 POSTOP FOLLOW-UP VISIT: CPT | Mod: ,,, | Performed by: SURGERY

## 2024-03-05 PROCEDURE — 1101F PT FALLS ASSESS-DOCD LE1/YR: CPT | Mod: CPTII,,, | Performed by: SURGERY

## 2024-03-05 PROCEDURE — 3288F FALL RISK ASSESSMENT DOCD: CPT | Mod: CPTII,,, | Performed by: SURGERY

## 2024-03-05 PROCEDURE — 1159F MED LIST DOCD IN RCRD: CPT | Mod: CPTII,,, | Performed by: SURGERY

## 2024-03-05 PROCEDURE — 3077F SYST BP >= 140 MM HG: CPT | Mod: CPTII,,, | Performed by: SURGERY

## 2024-03-05 NOTE — PROGRESS NOTES
[facility]  [address] [place] [id number]  Phone: [phone]  Fax: [phone]     Post Operative Progress Note  General Surgery  Dr. Guero Mir    Patient Name: Keyana Jameson  YOB: 1955  Author: Federica Garvin, ANP    Date: 03/05/2024                   SUBJECTIVE:     Chief Complaint/Reason for Admission:   Chief Complaint   Patient presents with    Post-op Evaluation     Redo nissen 1/8/24        History of Present Illness:  Ms. Keyana Jameson is a 68 y.o. female who is 2 months post op surgery.  The patient is currently without any complaints. Denies heartburn, reflux, regurgitation, NV.     Procedure:  Laparoscopic re-do Nissen fundoplication, Laparoscopic repair of recurrent Hiatal Hernia, Laparoscopic gastropexy  Date of surgery: 1/8/2024  Surgeon: Dr. Guero Mir    Pre op Diagnoses:   Recurrent Hiatal Hernia   GERD  SOB  Remote history of Lap Nissen Fundoplication 2005    Pre op meds for GERD: Protonix, Mylicon  She is taking these meds PRN now. No complaints since weaning off.     Diet: tolerating soft diet per post op protocol, rare symptom of dysphagia and belching. Occurs when she eats too quickly or a larger portion. Resolves spontaneously within 30 mins.     Patient Care Team:  SIGRID El Jr., MD as PCP - General (Family Medicine)  SUNNI Post MD as Consulting Physician (Gastroenterology)  Nargis Linton MD as Consulting Physician (Cardiology)  Guero Mir MD as Surgeon (General Surgery)    Review of Systems:  12 point ROS negative except as stated in HPI    PAST HISTORY:     Past Medical History:   Diagnosis Date    Acid reflux     Bursitis     right hip    Chest pressure     wears nitro patch prn; last used patch 1/2/24    Constipation     Coronary artery spasm     Diverticulitis     Hiatal hernia     Hypertension     Hypothyroidism, unspecified     Laryngitis     one  week ago    Pneumonia 05/05/2022    Sleep apnea, unspecified     states she does not use CPAP    Spasm of vocal cords      Past Surgical History:   Procedure Laterality Date    CARDIAC CATHETERIZATION  2000    CHOLECYSTECTOMY      COLON SURGERY  2017    Colon Resection    ESOPHAGOGASTRODUODENOSCOPY  2023    HYSTERECTOMY  1987    INCISIONAL HERNIA REPAIR  03/09/2021    LAPAROSCOPIC NISSEN FUNDOPLICATION N/A 1/8/2024    Procedure: FUNDOPLICATION, NISSEN, LAPAROSCOPIC  Lap takedown of previous Nissen Fundoplication and REDO Nissen Fundo;  Surgeon: Guero Mir MD;  Location: [facility];  Service: General;  Laterality: N/A;  **Have biological mesh for surgery** Lap takedown of previous Nissen Fundoplication and REDO Nissen Fundo    LUMBAR SPINE SURGERY  06/19/2019    NISSEN FUNDOPLICATION  2005    Dr. Jimenez    SINUS SURGERY       Family History   Problem Relation Age of Onset    Hypertension Mother     Glaucoma Mother     Diabetes Sister     Diabetes Brother      Social History     Socioeconomic History    Marital status:     Number of children: 4   Occupational History    Occupation: retired   Tobacco Use    Smoking status: Never    Smokeless tobacco: Never   Substance and Sexual Activity    Alcohol use: Not Currently    Drug use: Never    Sexual activity: Not Currently       MEDICATIONS & ALLERGIES:     Current Outpatient Medications on File Prior to Visit   Medication Sig    alendronate (FOSAMAX) 70 MG tablet TAKE 1 TABLET BY MOUTH ONE TIME PER WEEK    azelastine (OPTIVAR) 0.05 % ophthalmic solution 1 drop 2 (two) times daily.    diazePAM (VALIUM) 5 MG tablet Take 1 tablet (5 mg total) by mouth 2 (two) times daily as needed for Anxiety.    diclofenac (VOLTAREN) 75 MG EC tablet Take 75 mg by mouth 2 (two) times daily.    gabapentin (NEURONTIN) 400 MG capsule Take 1 capsule (400 mg total) by mouth 2 (two) times daily.    levothyroxine (SYNTHROID) 50 MCG tablet Take 1 tablet (50 mcg total) by mouth before  "breakfast.    multivitamin with minerals tablet Take 1 tablet by mouth once daily.    nitroGLYCERIN 0.4 MG/HR TD PT24 (NITRODUR) 0.4 mg/hr Place 1 patch onto the skin once daily. Pt states wears patch for twelve hours , then removes patch. Last used 1/2/24    olmesartan-hydrochlorothiazide (BENICAR HCT) 20-12.5 mg per tablet Take 0.5 tablets by mouth once daily.    pantoprazole (PROTONIX) 40 MG tablet Take 1 tablet (40 mg total) by mouth once daily.    simethicone (MYLICON) 125 MG chewable tablet Take 125 mg by mouth every 6 (six) hours as needed for Flatulence.    vitamin D (VITAMIN D3) 1000 units Tab Take 1 tablet by mouth every morning.    [DISCONTINUED] HYDROcodone-acetaminophen (NORCO) 7.5-325 mg per tablet Take 1 tablet by mouth every 6 (six) hours as needed for Pain.    [DISCONTINUED] ondansetron (ZOFRAN-ODT) 8 MG TbDL Take 1 tablet (8 mg total) by mouth every 8 (eight) hours as needed (nausea or vomiting).     No current facility-administered medications on file prior to visit.     Review of patient's allergies indicates:  No Known Allergies    OBJECTIVE:   Visit Vitals  BP (!) 142/85   Pulse 71   Ht 5' 3" (1.6 m)   Wt 70.5 kg (155 lb 6.4 oz)   BMI 27.53 kg/m²       Physical Exam:  General:  Well developed, well nourished, no acute distress  GI:  Abdomen soft, non-tender, non-distended, normoactive bowel sounds, no masses.   Skin:  Incision Clean/Dry/Intact. No evidence of infection.    VISIT DIAGNOSES:       ICD-10-CM ICD-9-CM   1. S/P Nissen fundoplication (without gastrostomy tube) procedure  Z98.890 V67.00   2. History of Nissen fundoplication  Z98.890 V15.29   3. Encounter for postoperative care  Z48.89 V58.49       ASSESSMENT/PLAN:     68 y.o. female 2 months post op s/p Laparoscopic re-do Nissen fundoplication, Laparoscopic repair of recurrent Hiatal Hernia, Laparoscopic gastropex per Dr. Mir. Remote history of Nissen in 2005 with outside surgeon.    -  Patient is progressing well.  -  Wounds " healed.  -  Ok to slowly advance diet to regular diet.  Slowly advance to dense animal proteins and starches at this time. Avoid carbonated beverages. Eat small portions, chew thoroughly.   -  Dr. Mir examined patient, discussed recommendations, and answered all questions regarding post op course.     RTC PRN    Federica Garvin, ANP

## 2024-04-26 ENCOUNTER — TELEPHONE (OUTPATIENT)
Dept: SURGERY | Facility: CLINIC | Age: 69
End: 2024-04-26
Payer: COMMERCIAL

## 2024-04-26 NOTE — TELEPHONE ENCOUNTER
----- Message from HANNAH Dumont sent at 4/26/2024  8:07 AM CDT -----  Regarding: RE: Voicemail  Yes. Small bites, chew slowly.   Avoid carbonated beverages for life.   FU PRN.  ----- Message -----  From: Cyndee Weeks MA  Sent: 4/25/2024   4:24 PM CDT  To: HANNAH Dumont  Subject: FW: Voicemail                                    Please advise?  ----- Message -----  From: Alaina Sanches MA  Sent: 4/25/2024   3:20 PM CDT  To: [facility] Staff  Subject: Voicemail                                        Pt left a voicemail wanting to know if she can resume her regular diet again  Redo nissen 1/8/24

## (undated) DEVICE — DRAPE FLUID WARMER ORS 44X44IN

## (undated) DEVICE — SUT ETHIBOND #0 EN-3 112CM

## (undated) DEVICE — SHEARS HS LONG 5MM CURVED

## (undated) DEVICE — GAUZE SPONGE 4X4 12PLY

## (undated) DEVICE — SOL IRRI STRL WATER 1000ML

## (undated) DEVICE — APPLICATOR STRL COT 2INNR 6IN

## (undated) DEVICE — DEVICE TK TI-KNOT 5MM REPL DEV

## (undated) DEVICE — NDL SAFETY 21G X 1 1/2 ECLPSE

## (undated) DEVICE — CANNULA ENDOPATH XCEL 5X100MM

## (undated) DEVICE — SUT TK QUIK LOAD

## (undated) DEVICE — BOWL UTILITY BLUE 32OZ

## (undated) DEVICE — GLOVE SURG BIOGEL LATEX SZ 7.5

## (undated) DEVICE — GLOVE SENSICARE PI MICRO 6

## (undated) DEVICE — TROCAR ENDOPATH XCEL 11MM 10CM

## (undated) DEVICE — SYS HYDRO-SURG IRR SMOKE EVAC

## (undated) DEVICE — Device

## (undated) DEVICE — DRAPE FULL SHEET 70X100IN

## (undated) DEVICE — SOL NORMAL USPCA 0.9%

## (undated) DEVICE — TROCAR ENDOPATH XCEL 11X100MM

## (undated) DEVICE — TROCAR ENDOPATH XCEL 5X100MM

## (undated) DEVICE — ADHESIVE MASTISOL VIAL 48/BX

## (undated) DEVICE — SUT VICRYL PLUS 4-0 FS-2 27IN

## (undated) DEVICE — GOWN X-LG STERILE BACK

## (undated) DEVICE — TOWEL OR DISP STRL BLUE 4/PK

## (undated) DEVICE — BINDER ABDOMINAL UNIV XLN 10IN

## (undated) DEVICE — SKINMARKER & RULER REGULAR X-F

## (undated) DEVICE — DRAIN JP STD PENROSE 1/4X12IN

## (undated) DEVICE — POUCH INSTRUMENT ADH 10X18IN

## (undated) DEVICE — NDL SYR 10ML 18X1.5 LL BLUNT

## (undated) DEVICE — SUPPORT ULNA NERVE PROTECTOR

## (undated) DEVICE — APPLIER CLIP ENDO MED/LG 10MM

## (undated) DEVICE — BLANKET SNUGGLE WARM ADLT SM

## (undated) DEVICE — PAD PREP CUFFED NS 24X48IN

## (undated) DEVICE — SCISSOR CURVED ENDOPATH 5MM